# Patient Record
Sex: FEMALE | Race: WHITE | NOT HISPANIC OR LATINO | URBAN - METROPOLITAN AREA
[De-identification: names, ages, dates, MRNs, and addresses within clinical notes are randomized per-mention and may not be internally consistent; named-entity substitution may affect disease eponyms.]

---

## 2018-08-05 ENCOUNTER — INPATIENT (INPATIENT)
Facility: HOSPITAL | Age: 78
LOS: 9 days | Discharge: HOME HEALTH | End: 2018-08-15
Attending: INTERNAL MEDICINE | Admitting: INTERNAL MEDICINE

## 2018-08-05 VITALS
RESPIRATION RATE: 18 BRPM | TEMPERATURE: 97 F | SYSTOLIC BLOOD PRESSURE: 181 MMHG | OXYGEN SATURATION: 100 % | HEART RATE: 73 BPM | DIASTOLIC BLOOD PRESSURE: 79 MMHG

## 2018-08-05 LAB
ALBUMIN SERPL ELPH-MCNC: 4 G/DL — SIGNIFICANT CHANGE UP (ref 3.5–5.2)
ALBUMIN SERPL ELPH-MCNC: 4 G/DL — SIGNIFICANT CHANGE UP (ref 3.5–5.2)
ALP SERPL-CCNC: 128 U/L — HIGH (ref 30–115)
ALP SERPL-CCNC: 157 U/L — HIGH (ref 30–115)
ALT FLD-CCNC: 329 U/L — HIGH (ref 0–41)
ALT FLD-CCNC: 78 U/L — HIGH (ref 0–41)
ANION GAP SERPL CALC-SCNC: 16 MMOL/L — HIGH (ref 7–14)
APPEARANCE UR: ABNORMAL
AST SERPL-CCNC: 171 U/L — HIGH (ref 0–41)
AST SERPL-CCNC: 617 U/L — HIGH (ref 0–41)
BACTERIA # UR AUTO: ABNORMAL /HPF
BASOPHILS # BLD AUTO: 0.06 K/UL — SIGNIFICANT CHANGE UP (ref 0–0.2)
BASOPHILS NFR BLD AUTO: 0.5 % — SIGNIFICANT CHANGE UP (ref 0–1)
BILIRUB DIRECT SERPL-MCNC: 0.2 MG/DL — SIGNIFICANT CHANGE UP (ref 0–0.2)
BILIRUB DIRECT SERPL-MCNC: 0.5 MG/DL — HIGH (ref 0–0.2)
BILIRUB INDIRECT FLD-MCNC: 0.1 MG/DL — LOW (ref 0.2–1.2)
BILIRUB INDIRECT FLD-MCNC: 0.3 MG/DL — SIGNIFICANT CHANGE UP (ref 0.2–1.2)
BILIRUB SERPL-MCNC: 0.3 MG/DL — SIGNIFICANT CHANGE UP (ref 0.2–1.2)
BILIRUB SERPL-MCNC: 0.8 MG/DL — SIGNIFICANT CHANGE UP (ref 0.2–1.2)
BILIRUB UR-MCNC: NEGATIVE — SIGNIFICANT CHANGE UP
BUN SERPL-MCNC: 18 MG/DL — SIGNIFICANT CHANGE UP (ref 10–20)
CALCIUM SERPL-MCNC: 9.5 MG/DL — SIGNIFICANT CHANGE UP (ref 8.5–10.1)
CHLORIDE SERPL-SCNC: 102 MMOL/L — SIGNIFICANT CHANGE UP (ref 98–110)
CO2 SERPL-SCNC: 23 MMOL/L — SIGNIFICANT CHANGE UP (ref 17–32)
COLOR SPEC: YELLOW — SIGNIFICANT CHANGE UP
CREAT SERPL-MCNC: 0.9 MG/DL — SIGNIFICANT CHANGE UP (ref 0.7–1.5)
DIFF PNL FLD: NEGATIVE — SIGNIFICANT CHANGE UP
EOSINOPHIL # BLD AUTO: 0.17 K/UL — SIGNIFICANT CHANGE UP (ref 0–0.7)
EOSINOPHIL NFR BLD AUTO: 1.5 % — SIGNIFICANT CHANGE UP (ref 0–8)
GLUCOSE SERPL-MCNC: 136 MG/DL — HIGH (ref 70–99)
GLUCOSE UR QL: NEGATIVE MG/DL — SIGNIFICANT CHANGE UP
HCT VFR BLD CALC: 41.9 % — SIGNIFICANT CHANGE UP (ref 37–47)
HGB BLD-MCNC: 13.8 G/DL — SIGNIFICANT CHANGE UP (ref 12–16)
IMM GRANULOCYTES NFR BLD AUTO: 0.5 % — HIGH (ref 0.1–0.3)
KETONES UR-MCNC: NEGATIVE — SIGNIFICANT CHANGE UP
LACTATE SERPL-SCNC: 2.7 MMOL/L — HIGH (ref 0.5–2.2)
LEUKOCYTE ESTERASE UR-ACNC: ABNORMAL
LIDOCAIN IGE QN: 57 U/L — SIGNIFICANT CHANGE UP (ref 7–60)
LYMPHOCYTES # BLD AUTO: 1.25 K/UL — SIGNIFICANT CHANGE UP (ref 1.2–3.4)
LYMPHOCYTES # BLD AUTO: 10.9 % — LOW (ref 20.5–51.1)
MCHC RBC-ENTMCNC: 27.2 PG — SIGNIFICANT CHANGE UP (ref 27–31)
MCHC RBC-ENTMCNC: 32.9 G/DL — SIGNIFICANT CHANGE UP (ref 32–37)
MCV RBC AUTO: 82.6 FL — SIGNIFICANT CHANGE UP (ref 81–99)
MONOCYTES # BLD AUTO: 0.58 K/UL — SIGNIFICANT CHANGE UP (ref 0.1–0.6)
MONOCYTES NFR BLD AUTO: 5.1 % — SIGNIFICANT CHANGE UP (ref 1.7–9.3)
NEUTROPHILS # BLD AUTO: 9.36 K/UL — HIGH (ref 1.4–6.5)
NEUTROPHILS NFR BLD AUTO: 81.5 % — HIGH (ref 42.2–75.2)
NITRITE UR-MCNC: NEGATIVE — SIGNIFICANT CHANGE UP
NRBC # BLD: 0 /100 WBCS — SIGNIFICANT CHANGE UP (ref 0–0)
PH UR: 7 — SIGNIFICANT CHANGE UP (ref 5–8)
PLATELET # BLD AUTO: 187 K/UL — SIGNIFICANT CHANGE UP (ref 130–400)
POTASSIUM SERPL-MCNC: 4.2 MMOL/L — SIGNIFICANT CHANGE UP (ref 3.5–5)
POTASSIUM SERPL-SCNC: 4.2 MMOL/L — SIGNIFICANT CHANGE UP (ref 3.5–5)
PROT SERPL-MCNC: 6.7 G/DL — SIGNIFICANT CHANGE UP (ref 6–8)
PROT SERPL-MCNC: 6.8 G/DL — SIGNIFICANT CHANGE UP (ref 6–8)
PROT UR-MCNC: NEGATIVE MG/DL — SIGNIFICANT CHANGE UP
RBC # BLD: 5.07 M/UL — SIGNIFICANT CHANGE UP (ref 4.2–5.4)
RBC # FLD: 13.8 % — SIGNIFICANT CHANGE UP (ref 11.5–14.5)
SODIUM SERPL-SCNC: 141 MMOL/L — SIGNIFICANT CHANGE UP (ref 135–146)
SP GR SPEC: 1.02 — SIGNIFICANT CHANGE UP (ref 1.01–1.03)
TROPONIN T SERPL-MCNC: <0.01 NG/ML — SIGNIFICANT CHANGE UP
UROBILINOGEN FLD QL: 0.2 MG/DL — SIGNIFICANT CHANGE UP (ref 0.2–0.2)
WBC # BLD: 11.48 K/UL — HIGH (ref 4.8–10.8)
WBC # FLD AUTO: 11.48 K/UL — HIGH (ref 4.8–10.8)
WBC UR QL: SIGNIFICANT CHANGE UP /HPF

## 2018-08-05 RX ORDER — FAMOTIDINE 10 MG/ML
20 INJECTION INTRAVENOUS ONCE
Qty: 0 | Refills: 0 | Status: COMPLETED | OUTPATIENT
Start: 2018-08-05 | End: 2018-08-05

## 2018-08-05 RX ORDER — MORPHINE SULFATE 50 MG/1
2 CAPSULE, EXTENDED RELEASE ORAL ONCE
Qty: 0 | Refills: 0 | Status: DISCONTINUED | OUTPATIENT
Start: 2018-08-05 | End: 2018-08-05

## 2018-08-05 RX ORDER — SODIUM CHLORIDE 9 MG/ML
1000 INJECTION INTRAMUSCULAR; INTRAVENOUS; SUBCUTANEOUS ONCE
Qty: 0 | Refills: 0 | Status: COMPLETED | OUTPATIENT
Start: 2018-08-05 | End: 2018-08-05

## 2018-08-05 RX ADMIN — SODIUM CHLORIDE 1000 MILLILITER(S): 9 INJECTION INTRAMUSCULAR; INTRAVENOUS; SUBCUTANEOUS at 21:45

## 2018-08-05 RX ADMIN — FAMOTIDINE 20 MILLIGRAM(S): 10 INJECTION INTRAVENOUS at 17:55

## 2018-08-05 RX ADMIN — MORPHINE SULFATE 2 MILLIGRAM(S): 50 CAPSULE, EXTENDED RELEASE ORAL at 20:41

## 2018-08-05 NOTE — ED PROVIDER NOTE - PROGRESS NOTE DETAILS
on re-eval pain is now to mid abdomen around umbilicus. ct ordered, sono cancelled. will order pain meds pt feels improved after morphine s/o Dr. andersen f/u imaging and re-eval RUQ US had been re-ordered due to elevated LFTs and no clear etiology on CT abd/pelvis. RUQ US showed no pathologies. LFTs elevated plus patient with chest pain and ALEENA score >1 means necessity for admission. SPoke with MAR who will admit patient. Patient informed of pulmonary nodules on CT as well.

## 2018-08-05 NOTE — ED PROVIDER NOTE - OBJECTIVE STATEMENT
77 y/o F with PMH of 1 stent, here for eval of sudden onset epigastric and lower chest pain with no radiation. Pt notes pain was severe but has resolved now. Proceeding to pain pt was eating a chocolate milkshake. Denies SOB, fever/chills, dysuria and lower abdominal pain. 79 y/o F with PMH of HTN, CAD with stent, here for eval of sudden onset epigastric and lower chest pain with no radiation. Pt notes pain was severe but has resolved now. Proceeding to pain pt was eating a chocolate milkshake. Denies SOB, fever/chills, dysuria and lower abdominal pain.

## 2018-08-05 NOTE — ED PROVIDER NOTE - MEDICAL DECISION MAKING DETAILS
Patient presented with chest and epigastric abdominal pain, initial cardiac work up negative but LFTs significantly elevated (no baseline). CT and US did not show clear etiology of symptoms. Patient with ALEENA >1 so required admission for ACS rule out, and subsequently for evaluation for elevated liver enzymes.

## 2018-08-06 LAB
ALBUMIN SERPL ELPH-MCNC: 3.8 G/DL — SIGNIFICANT CHANGE UP (ref 3.5–5.2)
ALBUMIN SERPL ELPH-MCNC: 4 G/DL — SIGNIFICANT CHANGE UP (ref 3.5–5.2)
ALP SERPL-CCNC: 194 U/L — HIGH (ref 30–115)
ALP SERPL-CCNC: 233 U/L — HIGH (ref 30–115)
ALT FLD-CCNC: 1158 U/L — HIGH (ref 0–41)
ALT FLD-CCNC: >700 U/L — HIGH (ref 0–41)
ANION GAP SERPL CALC-SCNC: 14 MMOL/L — SIGNIFICANT CHANGE UP (ref 7–14)
AST SERPL-CCNC: 1397 U/L — HIGH (ref 0–41)
AST SERPL-CCNC: >700 U/L — HIGH (ref 0–41)
BASE EXCESS BLDV CALC-SCNC: 0.3 MMOL/L — SIGNIFICANT CHANGE UP (ref -2–2)
BILIRUB DIRECT SERPL-MCNC: 2.1 MG/DL — HIGH (ref 0–0.2)
BILIRUB DIRECT SERPL-MCNC: 3.4 MG/DL — HIGH (ref 0–0.2)
BILIRUB INDIRECT FLD-MCNC: 0.3 MG/DL — SIGNIFICANT CHANGE UP (ref 0.2–1.2)
BILIRUB INDIRECT FLD-MCNC: 0.6 MG/DL — SIGNIFICANT CHANGE UP (ref 0.2–1.2)
BILIRUB SERPL-MCNC: 2.4 MG/DL — HIGH (ref 0.2–1.2)
BILIRUB SERPL-MCNC: 4 MG/DL — HIGH (ref 0.2–1.2)
BUN SERPL-MCNC: 11 MG/DL — SIGNIFICANT CHANGE UP (ref 10–20)
CA-I SERPL-SCNC: 1.19 MMOL/L — SIGNIFICANT CHANGE UP (ref 1.12–1.3)
CALCIUM SERPL-MCNC: 8.3 MG/DL — LOW (ref 8.5–10.1)
CHLORIDE SERPL-SCNC: 104 MMOL/L — SIGNIFICANT CHANGE UP (ref 98–110)
CK SERPL-CCNC: 50 U/L — SIGNIFICANT CHANGE UP (ref 0–225)
CO2 SERPL-SCNC: 21 MMOL/L — SIGNIFICANT CHANGE UP (ref 17–32)
CREAT SERPL-MCNC: 0.7 MG/DL — SIGNIFICANT CHANGE UP (ref 0.7–1.5)
GAS PNL BLDV: 141 MMOL/L — SIGNIFICANT CHANGE UP (ref 136–145)
GAS PNL BLDV: SIGNIFICANT CHANGE UP
GGT SERPL-CCNC: 334 U/L — HIGH (ref 1–40)
GLUCOSE SERPL-MCNC: 106 MG/DL — HIGH (ref 70–99)
HCO3 BLDV-SCNC: 26 MMOL/L — SIGNIFICANT CHANGE UP (ref 22–29)
HCT VFR BLD CALC: 39 % — SIGNIFICANT CHANGE UP (ref 37–47)
HCT VFR BLDA CALC: 42.3 % — SIGNIFICANT CHANGE UP (ref 34–44)
HGB BLD CALC-MCNC: 13.8 G/DL — LOW (ref 14–18)
HGB BLD-MCNC: 13.2 G/DL — SIGNIFICANT CHANGE UP (ref 12–16)
INR BLD: 1.19 RATIO — SIGNIFICANT CHANGE UP (ref 0.65–1.3)
LACTATE BLDV-MCNC: 1.4 MMOL/L — SIGNIFICANT CHANGE UP (ref 0.5–1.6)
LACTATE SERPL-SCNC: 1.4 MMOL/L — SIGNIFICANT CHANGE UP (ref 0.5–2.2)
MCHC RBC-ENTMCNC: 27.8 PG — SIGNIFICANT CHANGE UP (ref 27–31)
MCHC RBC-ENTMCNC: 33.8 G/DL — SIGNIFICANT CHANGE UP (ref 32–37)
MCV RBC AUTO: 82.3 FL — SIGNIFICANT CHANGE UP (ref 81–99)
NRBC # BLD: 0 /100 WBCS — SIGNIFICANT CHANGE UP (ref 0–0)
PCO2 BLDV: 46 MMHG — SIGNIFICANT CHANGE UP (ref 41–51)
PH BLDV: 7.36 — SIGNIFICANT CHANGE UP (ref 7.26–7.43)
PLATELET # BLD AUTO: 169 K/UL — SIGNIFICANT CHANGE UP (ref 130–400)
PO2 BLDV: 32 MMHG — SIGNIFICANT CHANGE UP (ref 20–40)
POTASSIUM BLDV-SCNC: 4.3 MMOL/L — SIGNIFICANT CHANGE UP (ref 3.3–5.6)
POTASSIUM SERPL-MCNC: 4.1 MMOL/L — SIGNIFICANT CHANGE UP (ref 3.5–5)
POTASSIUM SERPL-SCNC: 4.1 MMOL/L — SIGNIFICANT CHANGE UP (ref 3.5–5)
PROT SERPL-MCNC: 6.3 G/DL — SIGNIFICANT CHANGE UP (ref 6–8)
PROT SERPL-MCNC: 6.6 G/DL — SIGNIFICANT CHANGE UP (ref 6–8)
PROTHROM AB SERPL-ACNC: 12.8 SEC — SIGNIFICANT CHANGE UP (ref 9.95–12.87)
RBC # BLD: 4.74 M/UL — SIGNIFICANT CHANGE UP (ref 4.2–5.4)
RBC # FLD: 13.6 % — SIGNIFICANT CHANGE UP (ref 11.5–14.5)
SAO2 % BLDV: 57 % — SIGNIFICANT CHANGE UP
SODIUM SERPL-SCNC: 139 MMOL/L — SIGNIFICANT CHANGE UP (ref 135–146)
TROPONIN T SERPL-MCNC: <0.01 NG/ML — SIGNIFICANT CHANGE UP
WBC # BLD: 6.03 K/UL — SIGNIFICANT CHANGE UP (ref 4.8–10.8)
WBC # FLD AUTO: 6.03 K/UL — SIGNIFICANT CHANGE UP (ref 4.8–10.8)

## 2018-08-06 RX ORDER — ENOXAPARIN SODIUM 100 MG/ML
40 INJECTION SUBCUTANEOUS EVERY 24 HOURS
Qty: 0 | Refills: 0 | Status: DISCONTINUED | OUTPATIENT
Start: 2018-08-06 | End: 2018-08-15

## 2018-08-06 RX ORDER — MORPHINE SULFATE 50 MG/1
2 CAPSULE, EXTENDED RELEASE ORAL ONCE
Qty: 0 | Refills: 0 | Status: DISCONTINUED | OUTPATIENT
Start: 2018-08-06 | End: 2018-08-06

## 2018-08-06 RX ORDER — TRAMADOL HYDROCHLORIDE 50 MG/1
50 TABLET ORAL ONCE
Qty: 0 | Refills: 0 | Status: DISCONTINUED | OUTPATIENT
Start: 2018-08-06 | End: 2018-08-06

## 2018-08-06 RX ORDER — MORPHINE SULFATE 50 MG/1
2 CAPSULE, EXTENDED RELEASE ORAL EVERY 4 HOURS
Qty: 0 | Refills: 0 | Status: DISCONTINUED | OUTPATIENT
Start: 2018-08-06 | End: 2018-08-06

## 2018-08-06 RX ADMIN — ENOXAPARIN SODIUM 40 MILLIGRAM(S): 100 INJECTION SUBCUTANEOUS at 06:47

## 2018-08-06 RX ADMIN — MORPHINE SULFATE 2 MILLIGRAM(S): 50 CAPSULE, EXTENDED RELEASE ORAL at 04:42

## 2018-08-06 RX ADMIN — MORPHINE SULFATE 2 MILLIGRAM(S): 50 CAPSULE, EXTENDED RELEASE ORAL at 04:17

## 2018-08-06 RX ADMIN — TRAMADOL HYDROCHLORIDE 50 MILLIGRAM(S): 50 TABLET ORAL at 16:05

## 2018-08-06 NOTE — CONSULT NOTE ADULT - ATTENDING COMMENTS
Pt seen and examined with GI team.  Called to see patient for severe periumbilical abdominal pain, transient in nature and abnormal LFTs.  Having reviewed the CT and US results I would obtain an MRCP to ensure that no stone or sludge is present in the CBD.  Given the height of elevation of the transaminases I would also recommend a CLD work up to ensure that no hepatocellular process is present.  Please check hepatitis serologies, BARBARA, ASMA, ceruloplasm, ferritin, % saturation, and AMA

## 2018-08-06 NOTE — H&P ADULT - ASSESSMENT
77 y/o F with PMH of HTN, CAD with stent, depression ,breast ca in remission presents with acute onset abdominal pain around umbilicus area, found to have elevated liver enzymes on admission.    #Abdominal pain / Elevated liver enzymes  Ddx:? passed GB stone vs drug induced vs hepatitis   us abd: GB sludge. CT abd: no evidence of acute pathology  AST/ALT ratio >2:1 ? alcoholic hepatitis (pt. denies alcohol hx)  check blood alcohol level/ ggt  cannot rule out viral or toxin mediated hepatitis (less likely)  check hepatitis panel, toxicology screen  check 2nd set CE and serial ecgs  check urine culture  trend LFTs   pain control  GI evaluation    #HTN/CAD s/p PCI/Depression: Unknown home meds. Pharmacy [facility] and [facility] (closed at this hour). please verify home meds in AM and complete med rec.    #diet: dash diet  dvt ppx  obc 77 y/o F with PMH of HTN, CAD with stent, depression ,breast ca in remission presents with acute onset abdominal pain around umbilicus area, found to have elevated liver enzymes on admission.    #Acute Abdominal pain / Elevated liver enzymes  Ddx:? passed GB stone vs drug induced vs hepatitis   us abd: GB sludge. CT abd: no evidence of acute pathology  AST/ALT ratio >2:1 ? alcoholic hepatitis (pt. denies alcohol hx)  check blood alcohol level/ ggt  cannot rule out viral or toxin mediated hepatitis (less likely)  check hepatitis panel, toxicology screen  check 2nd set CE and serial ecgs  check urine culture  trend LFTs , repeat lactic acid  pain control, keep NPO, IVF fluid NS@75 cc/hr  Surgery / GI evaluation    #HTN/CAD s/p PCI/Depression: Unknown home meds. Pharmacy [facility] and [facility] (closed at this hour). please verify home meds in AM and complete med rec.    #diet:  NPO  dvt ppx  obc

## 2018-08-06 NOTE — CONSULT NOTE ADULT - SUBJECTIVE AND OBJECTIVE BOX
79 yo F w/ h/o HTN, CAD s/p stent(11 yrs ago), depression, breast ca s/p lumpectomy 10 yrs ago-in remission; presented from home on  w/ c/o sudden onset abdominal pain in periumbilical region. Pain started around 3-4 pm, in half an hour after having a drink with ice cream, it was 7/10, sharp, constant, nonradiating, diffuse, no aggravating/relieving factors, got relieved in 3-4 hours after coming here abd getting pain medication. It didn't recur since. She denies any anorexia, weight loss, nausea/vomiting, diarrhea, fever, chills, travel anywhere in last 10 yrs, any recent illness, any recent medication changes, IVDA, alcohol use- hasn't had a drink for a year, any OTC pain medication/tylenol use.     Last colonosocpy and possible endoscopy per patient was 6-7 yrs ago at [place] with Dr Arce--> unremarkable per patient  In ED, VS stable on admission  Found to have elevated liver enzymes so GI was consulted for evaluation.       PAST MEDICAL & SURGICAL HISTORY:  Malignant neoplasm of female breast, unspecified estrogen receptor status, unspecified laterality, unspecified site of breast  Depression, unspecified depression type  Coronary artery disease involving native heart without angina pectoris, unspecified vessel or lesion type  Hypertension, unspecified type    MEDICATIONS  (STANDING):  enoxaparin Injectable 40 milliGRAM(s) SubCutaneous every 24 hours    No Known Allergies    FAMILY HISTORY:  Sister had breast ca. No h/o liver disease or any cancer in family.    REVIEW OF SYSTEMS:    CONSTITUTIONAL: No weakness, fatigue, malaise, fevers or chills, no weight change, appetite change  Throat: No Dysphagia, No Odynophagia  RESPIRATORY: No SOB  CARDIOVASCULAR: No chest pain   Muscloskeletal: no joints pain  NEUROLOGICAL: No syncope or diziness  SKIN: No pruritis  Heme/Lymph: no LN enlargement    Physical Exam:  T(C): 36.9 (18 @ 08:13), Max: 36.9 (18 @ 08:13)  HR: 78 (18 @ 08:13) (73 - 78)  BP: 145/67 (18 @ 08:13) (145/67 - 181/79)  RR: 18 (18 @ 08:13) (18 - 18)  SpO2: 95% (18 @ 08:13) (95% - 100%)  EYES: No scleral icterus   LUNG: Clear to auscultation bilaterally; No rales, rhonchi, wheezing, or rubs  HEART: RRR; No murmurs  ABDOMEN: Soft, +BS, Abdominal Tenderness, No guarding, No Arenas Sign   Rectal Exam: not performed    Blood Work                            13.2   6.03  )-----------( 169      ( 06 Aug 2018 07:12 )             39.0           139  |  104  |  11  ----------------------------<  106<H>  4.1   |  21  |  0.7    Ca    8.3<L>      06 Aug 2018 07:12    TPro  6.3  /  Alb  3.8  /  TBili  2.4<H>  /  DBili  2.1<H>  /  AST  1397<H>  /  ALT  1158<H>  /  AlkPhos  194<H>                Urinalysis Basic - ( 05 Aug 2018 18:00 )    Color: Yellow / Appearance: Cloudy / S.025 / pH: x  Gluc: x / Ketone: Negative  / Bili: Negative / Urobili: 0.2 mg/dL   Blood: x / Protein: Negative mg/dL / Nitrite: Negative   Leuk Esterase: Small / RBC: x / WBC 3-5 /HPF   Sq Epi: x / Non Sq Epi: x / Bacteria: Many /HPF            Lactate Trend   @ 07:12 Lactate:1.4    @ 17:10 Lactate:2.7       CARDIAC MARKERS ( 06 Aug 2018 07:12 )  x     / <0.01 ng/mL / 50 U/L / x     / x      CARDIAC MARKERS ( 05 Aug 2018 17:10 )  x     / <0.01 ng/mL / x     / x     / x            CAPILLARY BLOOD GLUCOSE            < from: US Abdomen Limited (18 @ 01:02) >  Gallbladder sludge.     Otherwise unremarkable rightupper quadrant ultrasound.    < end of copied text >    < from: CT Abdomen and Pelvis w/ IV Cont (18 @ 00:03) >  No CT evidence for acute abdominopelvic pathology.    6 mm left lower lobe pulmonary nodule. As per Fleischner Society   guidelines, follow-up chest CT in 6-12 months is recommended       < end of copied text >          Radiology : 77 yo F w/ h/o HTN, CAD s/p stent(11 yrs ago), depression, breast ca s/p lumpectomy 10 yrs ago-in remission; presented from home on  w/ c/o sudden onset abdominal pain in periumbilical region. Pain started around 3-4 pm, in half an hour after having a drink with ice cream, it was 7/10, sharp, constant, nonradiating, diffuse, no aggravating/relieving factors, got relieved in 3-4 hours after coming here abd getting pain medication. It didn't recur since. She denies any anorexia, weight loss, nausea/vomiting, diarrhea, fever, chills, urine/stool color changes, travel anywhere in last 10 yrs, any recent illness, any recent medication changes, IVDA, alcohol use- hasn't had a drink for a year, any OTC pain medication/tylenol/herbal preparation use.     Last colonosocpy and possible endoscopy per patient was 6-7 yrs ago at [place] with Dr Arce--> unremarkable per patient  In ED, VS stable on admission  Found to have elevated liver enzymes so GI was consulted for evaluation.       PAST MEDICAL & SURGICAL HISTORY:  Malignant neoplasm of female breast, unspecified estrogen receptor status, unspecified laterality, unspecified site of breast  Depression, unspecified depression type  Coronary artery disease involving native heart without angina pectoris, unspecified vessel or lesion type  Hypertension, unspecified type    MEDICATIONS  (STANDING):  enoxaparin Injectable 40 milliGRAM(s) SubCutaneous every 24 hours    No Known Allergies    FAMILY HISTORY:  Sister had breast ca. No h/o liver disease or any cancer in family.    REVIEW OF SYSTEMS:    CONSTITUTIONAL: No weakness, fatigue, malaise, fevers or chills, no weight change, appetite change  Throat: No Dysphagia, No Odynophagia  RESPIRATORY: No SOB  CARDIOVASCULAR: No chest pain   Muscloskeletal: no joints pain  NEUROLOGICAL: No syncope or diziness  SKIN: No pruritis  Heme/Lymph: no LN enlargement    Physical Exam:  T(C): 36.9 (18 @ 08:13), Max: 36.9 (18 @ 08:13)  HR: 78 (18 @ 08:13) (73 - 78)  BP: 145/67 (18 @ 08:13) (145/67 - 181/79)  RR: 18 (18 @ 08:13) (18 - 18)  SpO2: 95% (18 @ 08:13) (95% - 100%)  EYES: No scleral icterus   LUNG: Clear to auscultation bilaterally; No rales, rhonchi, wheezing, or rubs  HEART: RRR; No murmurs  ABDOMEN: Soft, +BS, Abdominal Tenderness, No guarding, No Arenas Sign   Rectal Exam: not performed    Blood Work                            13.2   6.03  )-----------( 169      ( 06 Aug 2018 07:12 )             39.0           139  |  104  |  11  ----------------------------<  106<H>  4.1   |  21  |  0.7    Ca    8.3<L>      06 Aug 2018 07:12    TPro  6.3  /  Alb  3.8  /  TBili  2.4<H>  /  DBili  2.1<H>  /  AST  1397<H>  /  ALT  1158<H>  /  AlkPhos  194<H>                Urinalysis Basic - ( 05 Aug 2018 18:00 )    Color: Yellow / Appearance: Cloudy / S.025 / pH: x  Gluc: x / Ketone: Negative  / Bili: Negative / Urobili: 0.2 mg/dL   Blood: x / Protein: Negative mg/dL / Nitrite: Negative   Leuk Esterase: Small / RBC: x / WBC 3-5 /HPF   Sq Epi: x / Non Sq Epi: x / Bacteria: Many /HPF            Lactate Trend   @ 07:12 Lactate:1.4    @ 17:10 Lactate:2.7       CARDIAC MARKERS ( 06 Aug 2018 07:12 )  x     / <0.01 ng/mL / 50 U/L / x     / x      CARDIAC MARKERS ( 05 Aug 2018 17:10 )  x     / <0.01 ng/mL / x     / x     / x            CAPILLARY BLOOD GLUCOSE            < from: US Abdomen Limited (18 @ 01:02) >  Gallbladder sludge.     Otherwise unremarkable rightupper quadrant ultrasound.    < end of copied text >    < from: CT Abdomen and Pelvis w/ IV Cont (18 @ 00:03) >  No CT evidence for acute abdominopelvic pathology.    6 mm left lower lobe pulmonary nodule. As per Fleischner Society   guidelines, follow-up chest CT in 6-12 months is recommended       < end of copied text >          Radiology :

## 2018-08-06 NOTE — H&P ADULT - ATTENDING COMMENTS
Patient seen and examined at the bed side. not in distress.   Agree with above note.   Abdominal pain with abnormal liver function test enzyme. Worsening enzyme over the hospital stay.   Appreciated GI evaluation. Follow up Lab as per GI  Surgery evaluation before discharge. LFT has been worsening however.

## 2018-08-06 NOTE — CONSULT NOTE ADULT - ASSESSMENT
79 yo F w/ h/o HTN, CAD s/p stent(11 yrs ago), depression, breast ca s/p lumpectomy 10 yrs ago-in remission; presented from home on 08/05 w/ c/o sudden onset abdominal pain in periumbilical region. Pain started around 3-4 pm, in half an hour after having a drink with ice cream, it was 7/10, sharp, constant, nonradiating, diffuse, no aggravating/relieving factors, got relieved in 3-4 hours after coming here abd getting pain medication. It didn't recur since. She denies any anorexia, weight loss, nausea/vomiting, diarrhea, fever, chills, travel anywhere in last 10 yrs, any recent illness, any recent medication changes, IVDA, alcohol use- hasn't had a drink for a year, any OTC pain medication/tylenol use. No family h/o liver disease/cancer.     In ED, VS stable on admission  Found to have elevated liver enzymes so GI was consulted for evaluation.     A & P:    # Acute onset periumbilical abdominal pain w/ elevated liver chemistry in hepatocellular pattern likely 2/2 Acute infectious hepatitis   --> unlikely to be drug induced hepatitis as no hepatotoxic home meds or OTC meds/unlikely ischemic hepatitis as no hypotension/no h/o alcohol or IVDA  - please check 77 yo F w/ h/o HTN, CAD s/p stent(11 yrs ago), depression, breast ca s/p lumpectomy 10 yrs ago-in remission; presented from home on 08/05 w/ c/o sudden onset abdominal pain in periumbilical region. Pain started around 3-4 pm, in half an hour after having a drink with ice cream, it was 7/10, sharp, constant, nonradiating, diffuse, no aggravating/relieving factors, got relieved in 3-4 hours after coming here abd getting pain medication. It didn't recur since. She denies any anorexia, weight loss, nausea/vomiting, diarrhea, fever, chills, travel anywhere in last 10 yrs, any recent illness, any recent medication changes, IVDA, alcohol use- hasn't had a drink for a year, any OTC pain medication/tylenol use. No family h/o liver disease/cancer.     In ED, VS stable on admission  Found to have elevated liver enzymes so GI was consulted for evaluation.     A & P:    # Acute onset periumbilical abdominal pain w/ elevated liver chemistry in hepatocellular pattern likely 2/2 Acute infectious hepatitis   --> unlikely to be drug induced hepatitis as no hepatotoxic home meds or OTC meds/unlikely ischemic hepatitis as no hypotension/no h/o alcohol or IVDA  - please check PT/INR, HSV PCR, HIV  - f/u hep panel  - keep on low fat diet    ---> will discuss case with Dr Willis

## 2018-08-06 NOTE — H&P ADULT - NSHPLABSRESULTS_GEN_ALL_CORE
13.8   11.48 )-----------( 187      ( 05 Aug 2018 17:10 )             41.9           141  |  102  |  18  ----------------------------<  136<H>  4.2   |  23  |  0.9    Ca    9.5      05 Aug 2018 17:10    TPro  6.8  /  Alb  4.0  /  TBili  0.8  /  DBili  0.5<H>  /  AST  617<H>  /  ALT  329<H>  /  AlkPhos  157<H>                Urinalysis Basic - ( 05 Aug 2018 18:00 )    Color: Yellow / Appearance: Cloudy / S.025 / pH: x  Gluc: x / Ketone: Negative  / Bili: Negative / Urobili: 0.2 mg/dL   Blood: x / Protein: Negative mg/dL / Nitrite: Negative   Leuk Esterase: Small / RBC: x / WBC 3-5 /HPF   Sq Epi: x / Non Sq Epi: x / Bacteria: Many /HPF            Lactate Trend   @ 17:10 Lactate:2.7       CARDIAC MARKERS ( 05 Aug 2018 17:10 )  x     / <0.01 ng/mL / x     / x     / x

## 2018-08-06 NOTE — H&P ADULT - HISTORY OF PRESENT ILLNESS
77 y/o F with PMH of HTN, CAD with stent, depression ,breast ca in remission presents with acute onset abdominal pain.  Pt reports severe abdominal pain , 8/10,sharp, non radiating, around umbilicus area since afternoon. Pain started after eating a chocolate milkshake. Denies any associated symptoms. Denies nausea/vomiting/fever/chills/diarrhea/urinary symptoms/flank pain .  Denies CP/sob/cough/palpitations/heaache/travel hx /sick contact. ROS otherwise negative.

## 2018-08-06 NOTE — H&P ADULT - PMH
Coronary artery disease involving native heart without angina pectoris, unspecified vessel or lesion type    Depression, unspecified depression type    Hypertension, unspecified type    Malignant neoplasm of female breast, unspecified estrogen receptor status, unspecified laterality, unspecified site of breast

## 2018-08-07 LAB
ALBUMIN SERPL ELPH-MCNC: 4 G/DL — SIGNIFICANT CHANGE UP (ref 3.5–5.2)
ALP SERPL-CCNC: 252 U/L — HIGH (ref 30–115)
ALT FLD-CCNC: >700 U/L — HIGH (ref 0–41)
ANION GAP SERPL CALC-SCNC: 17 MMOL/L — HIGH (ref 7–14)
AST SERPL-CCNC: 567 U/L — HIGH (ref 0–41)
BASOPHILS # BLD AUTO: 0.05 K/UL — SIGNIFICANT CHANGE UP (ref 0–0.2)
BASOPHILS NFR BLD AUTO: 0.7 % — SIGNIFICANT CHANGE UP (ref 0–1)
BILIRUB DIRECT SERPL-MCNC: 1.2 MG/DL — HIGH (ref 0–0.2)
BILIRUB INDIRECT FLD-MCNC: 0.8 MG/DL — SIGNIFICANT CHANGE UP (ref 0.2–1.2)
BILIRUB SERPL-MCNC: 2 MG/DL — HIGH (ref 0.2–1.2)
BUN SERPL-MCNC: 12 MG/DL — SIGNIFICANT CHANGE UP (ref 10–20)
CALCIUM SERPL-MCNC: 8 MG/DL — LOW (ref 8.5–10.1)
CHLORIDE SERPL-SCNC: 102 MMOL/L — SIGNIFICANT CHANGE UP (ref 98–110)
CO2 SERPL-SCNC: 21 MMOL/L — SIGNIFICANT CHANGE UP (ref 17–32)
CREAT SERPL-MCNC: 0.6 MG/DL — LOW (ref 0.7–1.5)
EOSINOPHIL # BLD AUTO: 0.09 K/UL — SIGNIFICANT CHANGE UP (ref 0–0.7)
EOSINOPHIL NFR BLD AUTO: 1.2 % — SIGNIFICANT CHANGE UP (ref 0–8)
GLUCOSE SERPL-MCNC: 134 MG/DL — HIGH (ref 70–99)
HAV IGM SER-ACNC: SIGNIFICANT CHANGE UP
HBV CORE IGM SER-ACNC: SIGNIFICANT CHANGE UP
HBV SURFACE AB SER-ACNC: SIGNIFICANT CHANGE UP
HBV SURFACE AG SER-ACNC: SIGNIFICANT CHANGE UP
HCT VFR BLD CALC: 41.8 % — SIGNIFICANT CHANGE UP (ref 37–47)
HCV AB S/CO SERPL IA: 0.19 S/CO — SIGNIFICANT CHANGE UP
HCV AB SERPL-IMP: SIGNIFICANT CHANGE UP
HGB BLD-MCNC: 14.1 G/DL — SIGNIFICANT CHANGE UP (ref 12–16)
IMM GRANULOCYTES NFR BLD AUTO: 0.4 % — HIGH (ref 0.1–0.3)
INR BLD: 1.22 RATIO — SIGNIFICANT CHANGE UP (ref 0.65–1.3)
LYMPHOCYTES # BLD AUTO: 0.65 K/UL — LOW (ref 1.2–3.4)
LYMPHOCYTES # BLD AUTO: 8.8 % — LOW (ref 20.5–51.1)
MAGNESIUM SERPL-MCNC: 2.2 MG/DL — SIGNIFICANT CHANGE UP (ref 1.8–2.4)
MCHC RBC-ENTMCNC: 27.4 PG — SIGNIFICANT CHANGE UP (ref 27–31)
MCHC RBC-ENTMCNC: 33.7 G/DL — SIGNIFICANT CHANGE UP (ref 32–37)
MCV RBC AUTO: 81.2 FL — SIGNIFICANT CHANGE UP (ref 81–99)
MONOCYTES # BLD AUTO: 0.36 K/UL — SIGNIFICANT CHANGE UP (ref 0.1–0.6)
MONOCYTES NFR BLD AUTO: 4.9 % — SIGNIFICANT CHANGE UP (ref 1.7–9.3)
NEUTROPHILS # BLD AUTO: 6.17 K/UL — SIGNIFICANT CHANGE UP (ref 1.4–6.5)
NEUTROPHILS NFR BLD AUTO: 84 % — HIGH (ref 42.2–75.2)
NRBC # BLD: 0 /100 WBCS — SIGNIFICANT CHANGE UP (ref 0–0)
PLATELET # BLD AUTO: 157 K/UL — SIGNIFICANT CHANGE UP (ref 130–400)
POTASSIUM SERPL-MCNC: 3.8 MMOL/L — SIGNIFICANT CHANGE UP (ref 3.5–5)
POTASSIUM SERPL-SCNC: 3.8 MMOL/L — SIGNIFICANT CHANGE UP (ref 3.5–5)
PROT SERPL-MCNC: 6.6 G/DL — SIGNIFICANT CHANGE UP (ref 6–8)
PROTHROM AB SERPL-ACNC: 13.2 SEC — HIGH (ref 9.95–12.87)
RBC # BLD: 5.15 M/UL — SIGNIFICANT CHANGE UP (ref 4.2–5.4)
RBC # FLD: 13.8 % — SIGNIFICANT CHANGE UP (ref 11.5–14.5)
SODIUM SERPL-SCNC: 140 MMOL/L — SIGNIFICANT CHANGE UP (ref 135–146)
WBC # BLD: 7.35 K/UL — SIGNIFICANT CHANGE UP (ref 4.8–10.8)
WBC # FLD AUTO: 7.35 K/UL — SIGNIFICANT CHANGE UP (ref 4.8–10.8)

## 2018-08-07 RX ORDER — METOPROLOL TARTRATE 50 MG
25 TABLET ORAL DAILY
Qty: 0 | Refills: 0 | Status: DISCONTINUED | OUTPATIENT
Start: 2018-08-07 | End: 2018-08-08

## 2018-08-07 RX ORDER — METOPROLOL TARTRATE 50 MG
5 TABLET ORAL ONCE
Qty: 0 | Refills: 0 | Status: COMPLETED | OUTPATIENT
Start: 2018-08-07 | End: 2018-08-07

## 2018-08-07 RX ORDER — METOCLOPRAMIDE HCL 10 MG
10 TABLET ORAL ONCE
Qty: 0 | Refills: 0 | Status: COMPLETED | OUTPATIENT
Start: 2018-08-07 | End: 2018-08-07

## 2018-08-07 RX ORDER — LANOLIN ALCOHOL/MO/W.PET/CERES
5 CREAM (GRAM) TOPICAL AT BEDTIME
Qty: 0 | Refills: 0 | Status: DISCONTINUED | OUTPATIENT
Start: 2018-08-07 | End: 2018-08-15

## 2018-08-07 RX ADMIN — ENOXAPARIN SODIUM 40 MILLIGRAM(S): 100 INJECTION SUBCUTANEOUS at 05:19

## 2018-08-07 RX ADMIN — Medication 5 MILLIGRAM(S): at 22:49

## 2018-08-07 RX ADMIN — Medication 25 MILLIGRAM(S): at 05:19

## 2018-08-07 RX ADMIN — Medication 10 MILLIGRAM(S): at 02:44

## 2018-08-07 RX ADMIN — Medication 5 MILLIGRAM(S): at 02:44

## 2018-08-07 NOTE — PROGRESS NOTE ADULT - SUBJECTIVE AND OBJECTIVE BOX
PERRI GROVE  78y Female    INTERVAL HPI/OVERNIGHT EVENTS:  Patient seen and examined.    ROS: All other systems are negative.    Vital Signs:    T(F): 98.1 (08-07-18 @ 05:50), Max: 98.6 (08-06-18 @ 21:00)  HR: 81 (08-07-18 @ 05:50) (75 - 92)  BP: 161/72 (08-07-18 @ 05:50) (148/68 - 183/79)  RR: 18 (08-07-18 @ 05:50) (17 - 18)  SpO2: 95% (08-06-18 @ 22:45) (95% - 96%)  I&O's Summary    Daily Height in cm: 170.18 (06 Aug 2018 22:47)    Daily   CAPILLARY BLOOD GLUCOSE  114 (07 Aug 2018 11:09)  111 (07 Aug 2018 07:44)  124 (07 Aug 2018 02:05)          PHYSICAL EXAM:    GENERAL:  NAD  SKIN: No rashes or lesions  HENT: Atrumatic. Normocephalic. PERRL. Moist membranes.  NECK: Supple, No JVD. No lymphadenopathy.  PULMONARY: CTA B/L. No wheezing. No rales  CVS: Normal S1, S2. Rate and Rythm are regular. No murmurs.  ABDOMEN/GI: Soft, Nontender, Nondistended; BS present  EXTREMITIES: Peripheral pulses intact. No edema B/L LE.  NEUROLOGIC:  No motor or sensory deficit.  PSYCH: Alert & oriented x 3    Consultant(s) Notes Reviewed:  [x ] YES  [ ] NO  Care Discussed with Consultants/Other Providers [ x] YES  [ ] NO    EKG reviewed  Telemetry reviewed    LABS:                        14.1   7.35  )-----------( 157      ( 07 Aug 2018 08:53 )             41.8     08-07    140  |  102  |  12  ----------------------------<  134<H>  3.8   |  21  |  0.6<L>    Ca    8.0<L>      07 Aug 2018 08:53  Mg     2.2     08-07    TPro  6.6  /  Alb  4.0  /  TBili  2.0<H>  /  DBili  1.2<H>  /  AST  567<H>  /  ALT  >700<H>  /  AlkPhos  252<H>  08-07    PT/INR - ( 06 Aug 2018 12:38 )   PT: 12.80 sec;   INR: 1.19 ratio             Trop <0.01, CKMB --, CK 50, 08-06-18 @ 07:12  Trop <0.01, CKMB --, CK --, 08-05-18 @ 17:10      Culture - Urine (collected 05 Aug 2018 19:00)  Source: .Urine Clean Catch (Midstream)  Preliminary Report (07 Aug 2018 09:24):    >100,000 CFU/ml Enterococcus faecalis    <10,000 CFU/ml Normal Urogenital telly present        RADIOLOGY & ADDITIONAL TESTS:      Imaging or report Personally Reviewed:  [ ] YES  [ ] NO    Medications:  Standing  enoxaparin Injectable 40 milliGRAM(s) SubCutaneous every 24 hours  metoprolol succinate ER 25 milliGRAM(s) Oral daily    PRN Meds      Case discussed with resident    Care discussed with pt/family PERRI GROVE  78y Female    INTERVAL HPI/OVERNIGHT EVENTS:  Patient seen and examined. No more abdominal pain. No n/v.    ROS: All other systems are negative.    Vital Signs:    T(F): 98.1 (08-07-18 @ 05:50), Max: 98.6 (08-06-18 @ 21:00)  HR: 81 (08-07-18 @ 05:50) (75 - 92)  BP: 161/72 (08-07-18 @ 05:50) (148/68 - 183/79)  RR: 18 (08-07-18 @ 05:50) (17 - 18)  SpO2: 95% (08-06-18 @ 22:45) (95% - 96%)  I&O's Summary    Daily Height in cm: 170.18 (06 Aug 2018 22:47)    Daily   CAPILLARY BLOOD GLUCOSE  114 (07 Aug 2018 11:09)  111 (07 Aug 2018 07:44)  124 (07 Aug 2018 02:05)          PHYSICAL EXAM:    GENERAL:  NAD  SKIN: No rashes or lesions  HENT: Atrumatic. Normocephalic. PERRL. Moist membranes.  NECK: Supple, No JVD. No lymphadenopathy.  PULMONARY: CTA B/L. No wheezing. No rales  CVS: Normal S1, S2. Rate and Rythm are regular. No murmurs.  ABDOMEN/GI: Soft, Nontender, Nondistended; BS present  EXTREMITIES: Peripheral pulses intact. No edema B/L LE.  NEUROLOGIC:  No motor or sensory deficit.  PSYCH: Alert & oriented x 3    Consultant(s) Notes Reviewed:  [x ] YES  [ ] NO  Care Discussed with Consultants/Other Providers [ x] YES  [ ] NO    EKG reviewed  Telemetry reviewed    LABS:                        14.1   7.35  )-----------( 157      ( 07 Aug 2018 08:53 )             41.8     08-07    140  |  102  |  12  ----------------------------<  134<H>  3.8   |  21  |  0.6<L>    Ca    8.0<L>      07 Aug 2018 08:53  Mg     2.2     08-07    TPro  6.6  /  Alb  4.0  /  TBili  2.0<H>  /  DBili  1.2<H>  /  AST  567<H>  /  ALT  >700<H>  /  AlkPhos  252<H>  08-07    PT/INR - ( 06 Aug 2018 12:38 )   PT: 12.80 sec;   INR: 1.19 ratio             Trop <0.01, CKMB --, CK 50, 08-06-18 @ 07:12  Trop <0.01, CKMB --, CK --, 08-05-18 @ 17:10      Culture - Urine (collected 05 Aug 2018 19:00)  Source: .Urine Clean Catch (Midstream)  Preliminary Report (07 Aug 2018 09:24):    >100,000 CFU/ml Enterococcus faecalis    <10,000 CFU/ml Normal Urogenital telly present        RADIOLOGY & ADDITIONAL TESTS:      Imaging or report Personally Reviewed:  [ ] YES  [ ] NO    Medications:  Standing  enoxaparin Injectable 40 milliGRAM(s) SubCutaneous every 24 hours  metoprolol succinate ER 25 milliGRAM(s) Oral daily    PRN Meds      Case discussed with resident    Care discussed with pt/family

## 2018-08-07 NOTE — PROGRESS NOTE ADULT - SUBJECTIVE AND OBJECTIVE BOX
PERRI GROVE 78y Female  MRN#: [id number]   CODE STATUS:________      SUBJECTIVE  Patient is a 78y old Female who presents with a chief complaint of severe abdominal pain .  Currently admitted to medicine with the primary diagnosis of Transaminitis.  Today is hospital day 1d, and this morning she reports no overnight events.       OBJECTIVE  PAST MEDICAL & SURGICAL HISTORY  Malignant neoplasm of female breast, unspecified estrogen receptor status, unspecified laterality, unspecified site of breast  Depression, unspecified depression type  Coronary artery disease involving native heart without angina pectoris, unspecified vessel or lesion type  Hypertension, unspecified type    ALLERGIES:  No Known Allergies    MEDICATIONS:  STANDING MEDICATIONS  enoxaparin Injectable 40 milliGRAM(s) SubCutaneous every 24 hours  metoprolol succinate ER 25 milliGRAM(s) Oral daily    PRN MEDICATIONS      VITAL SIGNS: Last 24 Hours  T(C): 36.8 (07 Aug 2018 19:54), Max: 37 (06 Aug 2018 21:00)  T(F): 98.2 (07 Aug 2018 19:54), Max: 98.6 (06 Aug 2018 21:00)  HR: 75 (07 Aug 2018 19:54) (75 - 92)  BP: 142/90 (07 Aug 2018 19:54) (139/66 - 183/79)  BP(mean): --  RR: 18 (07 Aug 2018 19:54) (17 - 18)  SpO2: 95% (06 Aug 2018 22:45) (95% - 96%)    LABS:                        14.1   7.35  )-----------( 157      ( 07 Aug 2018 08:53 )             41.8     08-07    140  |  102  |  12  ----------------------------<  134<H>  3.8   |  21  |  0.6<L>    Ca    8.0<L>      07 Aug 2018 08:53  Mg     2.2     08-07    TPro  6.6  /  Alb  4.0  /  TBili  2.0<H>  /  DBili  1.2<H>  /  AST  567<H>  /  ALT  >700<H>  /  AlkPhos  252<H>  08-07    PT/INR - ( 07 Aug 2018 11:57 )   PT: 13.20 sec;   INR: 1.22 ratio                   Culture - Urine (collected 05 Aug 2018 19:00)  Source: .Urine Clean Catch (Midstream)  Preliminary Report (07 Aug 2018 09:24):    >100,000 CFU/ml Enterococcus faecalis    <10,000 CFU/ml Normal Urogenital telly present      CARDIAC MARKERS ( 06 Aug 2018 07:12 )  x     / <0.01 ng/mL / 50 U/L / x     / x          RADIOLOGY:< from: CT Abdomen and Pelvis w/ IV Cont (08.06.18 @ 00:03) >  No CT evidence for acute abdominopelvic pathology.    6 mm left lower lobe pulmonary nodule. As per Fleischner Society   guidelines, follow-up chest CT in 6-12 months is recommended         < end of copied text >    < from: US Abdomen Limited (08.06.18 @ 01:02) >    Gallbladder sludge.     Otherwise unremarkable rightupper quadrant ultrasound.      < end of copied text >        PHYSICAL EXAM:    GENERAL: NAD, well-developed, AAOx3  HEENT:  Atraumatic, Normocephalic. EOMI, PERRLA, conjunctiva and sclera clear, No JVD  PULMONARY: Clear to auscultation bilaterally; No wheeze  CARDIOVASCULAR: Regular rate and rhythm; No murmurs, rubs, or gallops  GASTROINTESTINAL: Soft, Nontender, Nondistended; Bowel sounds present  MUSCULOSKELETAL:  2+ Peripheral Pulses, No clubbing, cyanosis, or edema  NEUROLOGY: non-focal  SKIN: No rashes or lesions      ADMISSION SUMMARY  Patient is a 78y old Female who presents with a chief complaint of severe abdominal pain.  Currently admitted to medicine with the primary diagnosis of Transaminitis.    ASSESSMENT & PLAN    #Transaminitis     us abd: GB sludge. CT abd: no evidence of acute pathology  AST/ALT ratio >2:1 ? alcoholic hepatitis (pt. denies alcohol hx)  check blood alcohol level/ ggt  cannot rule out viral or toxin mediated hepatitis (less likely)  check hepatitis panel, toxicology screen  check 2nd set CE and serial ecgs  check urine culture  trend LFTs , repeat lactic acid  pain control, keep NPO, IVF fluid NS@75 cc/hr  Surgery / GI evaluation    #HTN/CAD s/p PCI/Depression: Unknown home meds. Pharmacy [facility] (closed at this hour). please verify home meds in AM and complete med rec.    #diet:  NPO  dvt ppx  obc PERRI GROVE 78y Female  MRN#: [id number]     SUBJECTIVE  Patient is a 78y old Female who presents with a chief complaint of severe abdominal pain .  Currently admitted to medicine with the primary diagnosis of Transaminitis.  Today is hospital day 1d, and this morning she reports no overnight events.       OBJECTIVE  PAST MEDICAL & SURGICAL HISTORY  Malignant neoplasm of female breast, unspecified estrogen receptor status, unspecified laterality, unspecified site of breast  Depression, unspecified depression type  Coronary artery disease involving native heart without angina pectoris, unspecified vessel or lesion type  Hypertension, unspecified type    ALLERGIES:  No Known Allergies    MEDICATIONS:  STANDING MEDICATIONS  enoxaparin Injectable 40 milliGRAM(s) SubCutaneous every 24 hours  metoprolol succinate ER 25 milliGRAM(s) Oral daily    PRN MEDICATIONS      VITAL SIGNS: Last 24 Hours  T(C): 36.8 (07 Aug 2018 19:54), Max: 37 (06 Aug 2018 21:00)  T(F): 98.2 (07 Aug 2018 19:54), Max: 98.6 (06 Aug 2018 21:00)  HR: 75 (07 Aug 2018 19:54) (75 - 92)  BP: 142/90 (07 Aug 2018 19:54) (139/66 - 183/79)  BP(mean): --  RR: 18 (07 Aug 2018 19:54) (17 - 18)  SpO2: 95% (06 Aug 2018 22:45) (95% - 96%)    LABS:                        14.1   7.35  )-----------( 157      ( 07 Aug 2018 08:53 )             41.8     08-07    140  |  102  |  12  ----------------------------<  134<H>  3.8   |  21  |  0.6<L>    Ca    8.0<L>      07 Aug 2018 08:53  Mg     2.2     08-07    TPro  6.6  /  Alb  4.0  /  TBili  2.0<H>  /  DBili  1.2<H>  /  AST  567<H>  /  ALT  >700<H>  /  AlkPhos  252<H>  08-07    PT/INR - ( 07 Aug 2018 11:57 )   PT: 13.20 sec;   INR: 1.22 ratio                   Culture - Urine (collected 05 Aug 2018 19:00)  Source: .Urine Clean Catch (Midstream)  Preliminary Report (07 Aug 2018 09:24):    >100,000 CFU/ml Enterococcus faecalis    <10,000 CFU/ml Normal Urogenital telly present      CARDIAC MARKERS ( 06 Aug 2018 07:12 )  x     / <0.01 ng/mL / 50 U/L / x     / x          RADIOLOGY:< from: CT Abdomen and Pelvis w/ IV Cont (08.06.18 @ 00:03) >  No CT evidence for acute abdominopelvic pathology.    6 mm left lower lobe pulmonary nodule. As per Fleischner Society   guidelines, follow-up chest CT in 6-12 months is recommended         < end of copied text >    < from: US Abdomen Limited (08.06.18 @ 01:02) >    Gallbladder sludge.     Otherwise unremarkable rightupper quadrant ultrasound.      < end of copied text >        PHYSICAL EXAM:    GENERAL: NAD, well-developed, AAOx3  HEENT:  Atraumatic, Normocephalic. EOMI, PERRLA, conjunctiva and sclera clear, No JVD  PULMONARY: Clear to auscultation bilaterally; No wheeze  CARDIOVASCULAR: Regular rate and rhythm; No murmurs, rubs, or gallops  GASTROINTESTINAL: Soft, Nontender, Nondistended; Bowel sounds present  MUSCULOSKELETAL:  2+ Peripheral Pulses, No clubbing, cyanosis, or edema  NEUROLOGY: non-focal  SKIN: No rashes or lesions      ADMISSION SUMMARY  Patient is a 78y old Female who presents with a chief complaint of severe abdominal pain.  Currently admitted to medicine with the primary diagnosis of Transaminitis.    ASSESSMENT & PLAN    #Transaminitis   -Pain abdomen since yesterday afternoon around the umbilicus, non radiating, 8/10 sharp relieved with pain medication.  -Elevated liver enzymes  -Ultrasound abd: GB sludge  -CT abd: no evidence of acute pathology  -cannot rule out viral or toxin mediated hepatitis   -f/u hepatitis panel, toxicology screen, blood alcohol level, HIV, HSV PCR, BARBARA ASMA AMA Antibodies   -f/u urine culture  -trend LFTs, repeat lactic acid  -keep NPO, IVF fluid NS@75 cc/hr  -f/u GI evaluation    #diet:  NPO  #dvt prophylaxis enoxaparin 40mg sc q24h  obc PERRI GROVE 78y Female  MRN#: [id number]     SUBJECTIVE  Patient is a 78y old Female who presents with a chief complaint of severe abdominal pain .  Currently admitted to medicine with the primary diagnosis of Transaminitis.  Today is hospital day 1d, and this morning she reports no overnight events.       OBJECTIVE  PAST MEDICAL & SURGICAL HISTORY  Malignant neoplasm of female breast, unspecified estrogen receptor status, unspecified laterality, unspecified site of breast  Depression, unspecified depression type  Coronary artery disease involving native heart without angina pectoris, unspecified vessel or lesion type  Hypertension, unspecified type    ALLERGIES:  No Known Allergies    MEDICATIONS:  STANDING MEDICATIONS  enoxaparin Injectable 40 milliGRAM(s) SubCutaneous every 24 hours  metoprolol succinate ER 25 milliGRAM(s) Oral daily    PRN MEDICATIONS      VITAL SIGNS: Last 24 Hours  T(C): 36.8 (07 Aug 2018 19:54), Max: 37 (06 Aug 2018 21:00)  T(F): 98.2 (07 Aug 2018 19:54), Max: 98.6 (06 Aug 2018 21:00)  HR: 75 (07 Aug 2018 19:54) (75 - 92)  BP: 142/90 (07 Aug 2018 19:54) (139/66 - 183/79)  BP(mean): --  RR: 18 (07 Aug 2018 19:54) (17 - 18)  SpO2: 95% (06 Aug 2018 22:45) (95% - 96%)    LABS:                        14.1   7.35  )-----------( 157      ( 07 Aug 2018 08:53 )             41.8     08-07    140  |  102  |  12  ----------------------------<  134<H>  3.8   |  21  |  0.6<L>    Ca    8.0<L>      07 Aug 2018 08:53  Mg     2.2     08-07    TPro  6.6  /  Alb  4.0  /  TBili  2.0<H>  /  DBili  1.2<H>  /  AST  567<H>  /  ALT  >700<H>  /  AlkPhos  252<H>  08-07    PT/INR - ( 07 Aug 2018 11:57 )   PT: 13.20 sec;   INR: 1.22 ratio                   Culture - Urine (collected 05 Aug 2018 19:00)  Source: .Urine Clean Catch (Midstream)  Preliminary Report (07 Aug 2018 09:24):    >100,000 CFU/ml Enterococcus faecalis    <10,000 CFU/ml Normal Urogenital telly present      CARDIAC MARKERS ( 06 Aug 2018 07:12 )  x     / <0.01 ng/mL / 50 U/L / x     / x          RADIOLOGY:< from: CT Abdomen and Pelvis w/ IV Cont (08.06.18 @ 00:03) >  No CT evidence for acute abdominopelvic pathology.    6 mm left lower lobe pulmonary nodule. As per Fleischner Society   guidelines, follow-up chest CT in 6-12 months is recommended         < end of copied text >    < from: US Abdomen Limited (08.06.18 @ 01:02) >    Gallbladder sludge.     Otherwise unremarkable rightupper quadrant ultrasound.      < end of copied text >        PHYSICAL EXAM:    GENERAL: NAD, well-developed, AAOx3  HEENT:  Atraumatic, Normocephalic. EOMI, PERRLA, conjunctiva and sclera clear, No JVD  PULMONARY: Clear to auscultation bilaterally; No wheeze  CARDIOVASCULAR: Regular rate and rhythm; No murmurs, rubs, or gallops  GASTROINTESTINAL: Soft, Nontender, Nondistended; Bowel sounds present  MUSCULOSKELETAL:  2+ Peripheral Pulses, No clubbing, cyanosis, or edema  NEUROLOGY: non-focal  SKIN: No rashes or lesions      ADMISSION SUMMARY  Patient is a 78y old Female who presents with a chief complaint of severe abdominal pain.  Currently admitted to medicine with the primary diagnosis of Transaminitis.    ASSESSMENT & PLAN    #Transaminitis   -Pain abdomen since yesterday afternoon around the umbilicus, non radiating, 8/10 sharp relieved with pain medication.  -Elevated liver enzymes  -Ultrasound abd: GB sludge  -CT abd: no evidence of acute pathology  -cannot rule out viral or toxin mediated hepatitis   -f/u hepatitis panel, toxicology screen, blood alcohol level, HIV, HSV PCR, BARBARA ASMA AMA Antibodies   -f/u urine culture  -trend LFTs, repeat lactic acid  -keep NPO, IVF fluid NS@75 cc/hr  -f/u GI evaluation    #diet:Regular   #dvt prophylaxis enoxaparin 40mg sc q24h  obc

## 2018-08-07 NOTE — PROGRESS NOTE ADULT - ASSESSMENT
79 y/o F with PMH of HTN, CAD with stent, depression ,breast ca in remission presents with acute onset abdominal pain around umbilicus area, found to have elevated liver enzymes on admission.    1.	Abdominal pain  2.	Abnormal Liver function study  3.	CAD/HTN 77 y/o F with PMH of HTN, CAD with stent, depression ,breast ca in remission presents with acute onset abdominal pain around umbilicus area, found to have elevated liver enzymes on admission.    1.	Abdominal pain - resolved  2.	Abnormal Liver function study  3.	CAD/HTN         PLAN:    ·	LFT'S trending down. ALP remains elevated.  ·	GI eval noted. Recommended MRCP and W/U for Ch. liver disease.  ·	Cont Metoprolol

## 2018-08-08 LAB
-  AMPICILLIN: SIGNIFICANT CHANGE UP
-  CIPROFLOXACIN: SIGNIFICANT CHANGE UP
-  LEVOFLOXACIN: SIGNIFICANT CHANGE UP
-  NITROFURANTOIN: SIGNIFICANT CHANGE UP
-  TETRACYCLINE: SIGNIFICANT CHANGE UP
-  VANCOMYCIN: SIGNIFICANT CHANGE UP
ALBUMIN SERPL ELPH-MCNC: 3.7 G/DL — SIGNIFICANT CHANGE UP (ref 3.5–5.2)
ALP SERPL-CCNC: 233 U/L — HIGH (ref 30–115)
ALT FLD-CCNC: 723 U/L — HIGH (ref 0–41)
ANION GAP SERPL CALC-SCNC: 15 MMOL/L — HIGH (ref 7–14)
AST SERPL-CCNC: 372 U/L — HIGH (ref 0–41)
BILIRUB DIRECT SERPL-MCNC: 2.1 MG/DL — HIGH (ref 0–0.2)
BILIRUB INDIRECT FLD-MCNC: 0.4 MG/DL — SIGNIFICANT CHANGE UP (ref 0.2–1.2)
BILIRUB SERPL-MCNC: 2.5 MG/DL — HIGH (ref 0.2–1.2)
BUN SERPL-MCNC: 13 MG/DL — SIGNIFICANT CHANGE UP (ref 10–20)
CALCIUM SERPL-MCNC: 8.3 MG/DL — LOW (ref 8.5–10.1)
CERULOPLASMIN SERPL-MCNC: 29 MG/DL — SIGNIFICANT CHANGE UP (ref 20–60)
CHLORIDE SERPL-SCNC: 104 MMOL/L — SIGNIFICANT CHANGE UP (ref 98–110)
CO2 SERPL-SCNC: 22 MMOL/L — SIGNIFICANT CHANGE UP (ref 17–32)
CREAT SERPL-MCNC: 0.6 MG/DL — LOW (ref 0.7–1.5)
CULTURE RESULTS: SIGNIFICANT CHANGE UP
GLUCOSE SERPL-MCNC: 106 MG/DL — HIGH (ref 70–99)
HCT VFR BLD CALC: 42.4 % — SIGNIFICANT CHANGE UP (ref 37–47)
HGB BLD-MCNC: 14.5 G/DL — SIGNIFICANT CHANGE UP (ref 12–16)
HIV 1+2 AB+HIV1 P24 AG SERPL QL IA: SIGNIFICANT CHANGE UP
MCHC RBC-ENTMCNC: 27.8 PG — SIGNIFICANT CHANGE UP (ref 27–31)
MCHC RBC-ENTMCNC: 34.2 G/DL — SIGNIFICANT CHANGE UP (ref 32–37)
MCV RBC AUTO: 81.2 FL — SIGNIFICANT CHANGE UP (ref 81–99)
METHOD TYPE: SIGNIFICANT CHANGE UP
MITOCHONDRIA AB SER-ACNC: SIGNIFICANT CHANGE UP
NRBC # BLD: 0 /100 WBCS — SIGNIFICANT CHANGE UP (ref 0–0)
ORGANISM # SPEC MICROSCOPIC CNT: SIGNIFICANT CHANGE UP
ORGANISM # SPEC MICROSCOPIC CNT: SIGNIFICANT CHANGE UP
PLATELET # BLD AUTO: 149 K/UL — SIGNIFICANT CHANGE UP (ref 130–400)
POTASSIUM SERPL-MCNC: 3.7 MMOL/L — SIGNIFICANT CHANGE UP (ref 3.5–5)
POTASSIUM SERPL-SCNC: 3.7 MMOL/L — SIGNIFICANT CHANGE UP (ref 3.5–5)
PROT SERPL-MCNC: 6.3 G/DL — SIGNIFICANT CHANGE UP (ref 6–8)
RBC # BLD: 5.22 M/UL — SIGNIFICANT CHANGE UP (ref 4.2–5.4)
RBC # FLD: 13.8 % — SIGNIFICANT CHANGE UP (ref 11.5–14.5)
SMOOTH MUSCLE AB SER-ACNC: SIGNIFICANT CHANGE UP
SODIUM SERPL-SCNC: 141 MMOL/L — SIGNIFICANT CHANGE UP (ref 135–146)
SPECIMEN SOURCE: SIGNIFICANT CHANGE UP
WBC # BLD: 9.07 K/UL — SIGNIFICANT CHANGE UP (ref 4.8–10.8)
WBC # FLD AUTO: 9.07 K/UL — SIGNIFICANT CHANGE UP (ref 4.8–10.8)

## 2018-08-08 RX ORDER — MORPHINE SULFATE 50 MG/1
2 CAPSULE, EXTENDED RELEASE ORAL ONCE
Qty: 0 | Refills: 0 | Status: DISCONTINUED | OUTPATIENT
Start: 2018-08-08 | End: 2018-08-08

## 2018-08-08 RX ORDER — METOPROLOL TARTRATE 50 MG
50 TABLET ORAL DAILY
Qty: 0 | Refills: 0 | Status: DISCONTINUED | OUTPATIENT
Start: 2018-08-08 | End: 2018-08-15

## 2018-08-08 RX ADMIN — ENOXAPARIN SODIUM 40 MILLIGRAM(S): 100 INJECTION SUBCUTANEOUS at 05:23

## 2018-08-08 RX ADMIN — Medication 5 MILLIGRAM(S): at 21:14

## 2018-08-08 RX ADMIN — Medication 25 MILLIGRAM(S): at 05:22

## 2018-08-08 RX ADMIN — Medication 50 MILLIGRAM(S): at 21:14

## 2018-08-08 RX ADMIN — MORPHINE SULFATE 2 MILLIGRAM(S): 50 CAPSULE, EXTENDED RELEASE ORAL at 04:41

## 2018-08-08 RX ADMIN — MORPHINE SULFATE 2 MILLIGRAM(S): 50 CAPSULE, EXTENDED RELEASE ORAL at 06:13

## 2018-08-08 NOTE — PROGRESS NOTE ADULT - ASSESSMENT
77 y/o rhea with PMH of HTN, CAD with stent, depression, Breast ca in remission presents with acute onset abdominal pain around umbilicus area and found to have elevated liver enzymes on admission.    1.	Abdominal pain - resolved again today  2.	Abnormal LFTs  3.	CAD  4.	HTN - uncontrolled         PLAN:    ·	AST and ALT trending down. ALP remains elevated and TB rising. Trend LFTS. INR WNL.   ·	etiology unknown at this time - pt was not hypotensive. Hepatitis profile negative and HIV nonreactive.  ·	MRI shows cholelithiasis but no biliary ductal dilatation or CBD stone  ·	GI f/u appreciated - recommends surgery eval for possible atypical presentation of biliary colic (pt with sludge on US) and liver biopsy by IR  ·	f/u pending tests for liver disease  ·	increase Metoprolol to 25mg q12hr and monitor BP

## 2018-08-08 NOTE — PROGRESS NOTE ADULT - ATTENDING COMMENTS
Pt seen and examined.  Pt does not have biliary dilatation of CBDS on MRCP.  For the possibility of an atypical presentation of biliary colic please recall surgery for their opinion.  Hepatitis serologies are unrevealing and the other serologies are PND.  Since we now believe that she may have a parenchymal cause for her abnormal LFTs I would recommend an IR guided biopsy and discussed with hospitalist.

## 2018-08-08 NOTE — PROGRESS NOTE ADULT - SUBJECTIVE AND OBJECTIVE BOX
PERRI GROVE 78y Female  MRN#: [id number]   CODE STATUS:________      SUBJECTIVE  Patient is a 78y old Female who presents with a chief complaint of severe abdominal pain   Currently admitted to medicine with the primary diagnosis of Transaminitis  Today is hospital day 2d, and this morning she reports diffuse pain abdomen last night which was relieved with morphine.     OBJECTIVE  PAST MEDICAL & SURGICAL HISTORY  Malignant neoplasm of female breast, unspecified estrogen receptor status, unspecified laterality, unspecified site of breast  Depression, unspecified depression type  Coronary artery disease involving native heart without angina pectoris, unspecified vessel or lesion type  Hypertension, unspecified type    ALLERGIES:  No Known Allergies    MEDICATIONS:  STANDING MEDICATIONS  enoxaparin Injectable 40 milliGRAM(s) SubCutaneous every 24 hours  melatonin 5 milliGRAM(s) Oral at bedtime  metoprolol succinate ER 25 milliGRAM(s) Oral daily    PRN MEDICATIONS      VITAL SIGNS: Last 24 Hours  T(C): 36 (08 Aug 2018 12:33), Max: 37.1 (08 Aug 2018 04:05)  T(F): 96.8 (08 Aug 2018 12:33), Max: 98.7 (08 Aug 2018 04:05)  HR: 80 (08 Aug 2018 12:33) (75 - 83)  BP: 119/60 (08 Aug 2018 12:33) (119/60 - 184/81)  BP(mean): --  RR: 18 (08 Aug 2018 12:33) (18 - 18)  SpO2: --    LABS:                        14.5   9.07  )-----------( 149      ( 08 Aug 2018 07:45 )             42.4     08-08    141  |  104  |  13  ----------------------------<  106<H>  3.7   |  22  |  0.6<L>    Ca    8.3<L>      08 Aug 2018 07:45  Mg     2.2     08-07    TPro  6.3  /  Alb  3.7  /  TBili  2.5<H>  /  DBili  2.1<H>  /  AST  372<H>  /  ALT  723<H>  /  AlkPhos  233<H>  08-08    PT/INR - ( 07 Aug 2018 11:57 )   PT: 13.20 sec;   INR: 1.22 ratio                   Culture - Urine (collected 05 Aug 2018 19:00)  Source: .Urine Clean Catch (Midstream)  Final Report (08 Aug 2018 11:30):    >100,000 CFU/ml Enterococcus faecalis    <10,000 CFU/ml Normal Urogenital telly present  Organism: Enterococcus faecalis (08 Aug 2018 11:30)  Organism: Enterococcus faecalis (08 Aug 2018 11:30)      RADIOLOGY: < from: MR Abdomen No Cont (08.07.18 @ 22:38) >  .  Cholelithiasis without evidence for choledocholithiasis or biliary   ductal dilation; low insertion of the cystic duct which courses posterior   to the common hepatic duct.    2.  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 A follow-up pancreas protocol MRI with   contrast is recommended in one year to document stability.    < end of copied text >      from: CT Abdomen and Pelvis w/ IV Cont (08.06.18 @ 00:03) >  No CT evidence for acute abdominopelvic pathology.    6 mm left lower lobe pulmonary nodule. As per Fleischner Society   guidelines, follow-up chest CT in 6-12 months is recommended         < end of copied text >    < from: US Abdomen Limited (08.06.18 @ 01:02) >    Gallbladder sludge.     Otherwise unremarkable rightupper quadrant ultrasound.      < end of copied text >        PHYSICAL EXAM:    GENERAL: NAD, well-developed, AAOx3  HEENT:  Atraumatic, Normocephalic. EOMI, PERRLA, conjunctiva and sclera clear, No JVD  PULMONARY: Clear to auscultation bilaterally; No wheeze  CARDIOVASCULAR: Regular rate and rhythm; No murmurs, rubs, or gallops  GASTROINTESTINAL: Soft, Nontender, distended;                                 No guarding or rigidity, Bowel sounds present  MUSCULOSKELETAL:  2+ Peripheral Pulses, No clubbing, cyanosis, or edema  NEUROLOGY: non-focal  SKIN: No rashes or lesions      ADMISSION SUMMARY  Patient is a 78y old Female who presents with a chief complaint of severe abdominal pain.  Currently admitted to medicine with the primary diagnosis of Transaminitis      ASSESSMENT & PLAN  #Transaminitis   -Pain abdomen  for 1 day around the umbilicus, non radiating, 8/10 sharp relieved with pain medication.  -Patient had diffuse pain abdomen last night which was relieved with morphine.  -Complained of tightness of abdomen this evening. Patient last passed stool yesterday but she said that was her routine.   Abdomen was distended. No guarding or rigidity present. X ray erect abdomen ordered   - liver enzymes trending down, Negative hepatitis panel  -Ultrasound abd: GB sludge  -CT abd: no evidence of acute pathology  -MRCP showed cholelithiasis.  -Surgery consulted for atypical biliary colic (as per GI)  -Liver biopsy scheduled tomorrow by IR.  -f/u GI and surgery consult.  -cannot rule out viral or toxin mediated hepatitis   -f/u  toxicology screen, blood alcohol level, HIV, HSV PCR, BABRARA ASMA AMA Antibodies   -trend LFTs, repeat lactic acid  -keep NPO, IVF fluid NS@75 cc/hr, Hold enoxaparin am dose    #diet:Regular   #dvt prophylaxis enoxaparin 40mg sc q24h  obc

## 2018-08-08 NOTE — PROGRESS NOTE ADULT - ASSESSMENT
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 77 yo F w/ h/o HTN, CAD s/p stent(11 yrs ago), depression, breast ca s/p lumpectomy 10 yrs ago-in remission; presented from home on 08/05 w/ c/o sudden onset abdominal pain in periumbilical region    Acute reccurent abdominal pain with increase liver enzymes rule out passed CBD stone or other parenchymal etiologies    Liver enzymes trending down , Today's labs still pending   MRCP showed cholelithiasis but no CBD stone or dilatation  Acute infectious hepatitis workup negative and the rest of workup still pending   follow liver enzymes  supportive care   Since we now suspect a parenchymal cause for the patient abnormal liver chemistry will recommend IR guided liver biopsy       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

## 2018-08-08 NOTE — PROGRESS NOTE ADULT - SUBJECTIVE AND OBJECTIVE BOX
PERRI GROVEy Female    INTERVAL HPI/OVERNIGHT EVENTS:    Pt had severe lower abdominal pain last night - relieved after IV morphine.   Today, she denies pain, N/V.  Case discussed with GI team at bedside.    T(F): 98.7 (08-08-18 @ 05:52), Max: 98.7 (08-08-18 @ 04:05)  HR: 83 (08-08-18 @ 05:52) (75 - 83)  BP: 175/78 (08-08-18 @ 05:52) (142/90 - 184/81)  RR: 18 (08-08-18 @ 05:52) (18 - 18)  SpO2: --    I&O's Summary    07 Aug 2018 07:01  -  08 Aug 2018 07:00  --------------------------------------------------------  IN: 360 mL / OUT: 0 mL / NET: 360 mL      PHYSICAL EXAM:  GENERAL: NAD  HEAD:  Normocephalic  EYES:  conjunctiva and sclera clear  ENMT: Moist mucous membranes  NECK: Supple, No JVD  NERVOUS SYSTEM:  Alert & Oriented X3, Good concentration  CHEST/LUNG: Clear to percussion bilaterally; No rales, rhonchi, wheezing  HEART: Regular rate and rhythm; No murmurs  ABDOMEN: Soft, Nontender even to deep palpation, Nondistended; Bowel sounds present  EXTREMITIES:   No edema  SKIN: No rashes     Consultant(s) Notes Reviewed:  [x ] YES  [ ] NO  Care Discussed with Consultants/Other Providers [ x] YES  [ ] NO    MEDICATIONS  (STANDING):  enoxaparin Injectable 40 milliGRAM(s) SubCutaneous every 24 hours  melatonin 5 milliGRAM(s) Oral at bedtime  metoprolol succinate ER 25 milliGRAM(s) Oral daily    MEDICATIONS  (PRN):    EKG reviewed  Telemetry reviewed    LABS:                        14.5   9.07  )-----------( 149      ( 08 Aug 2018 07:45 )             42.4     08-08    141  |  104  |  13  ----------------------------<  106<H>  3.7   |  22  |  0.6<L>    Ca    8.3<L>      08 Aug 2018 07:45  Mg     2.2     08-07    TPro  6.3  /  Alb  3.7  /  TBili  2.5<H>  /  DBili  2.1<H>  /  AST  372<H>  /  ALT  723<H>  /  AlkPhos  233<H>  08-08    PT/INR - ( 07 Aug 2018 11:57 )   PT: 13.20 sec;   INR: 1.22 ratio             Urinalysis (08.05.18 @ 18:00)    pH Urine: 7.0    Glucose Qualitative, Urine: Negative mg/dL    Blood, Urine: Negative    Color: Yellow    Urine Appearance: Cloudy    Bilirubin: Negative    Ketone - Urine: Negative    Specific Gravity: 1.025    Protein, Urine: Negative mg/dL    Urobilinogen: 0.2 mg/dL    Nitrite: Negative    Leukocyte Esterase Concentration: Small    Urine Microscopic-Add On (NC) (08.05.18 @ 18:00)    Bacteria: Many /HPF    White Blood Cell - Urine: 3-5 /HPF        Culture - Urine (collected 05 Aug 2018 19:00)  Source: .Urine Clean Catch (Midstream)  Final Report (08 Aug 2018 11:30):    >100,000 CFU/ml Enterococcus faecalis    <10,000 CFU/ml Normal Urogenital telly present  Organism: Enterococcus faecalis (08 Aug 2018 11:30)  Organism: Enterococcus faecalis (08 Aug 2018 11:30)        RADIOLOGY & ADDITIONAL TESTS:    Imaging or report Personally Reviewed:  [x ] YES  [ ] NO    < from: US Abdomen Limited (08.06.18 @ 01:02) >  IMPRESSION:    Gallbladder sludge.     Otherwise unremarkable rightupper quadrant ultrasound.    < end of copied text >      < from: MR Abdomen No Cont (08.07.18 @ 22:38) >  IMPRESSION:  1.  Cholelithiasis without evidence for choledocholithiasis or biliary   ductal dilation; low insertion of the cystic duct which courses posterior   to the common hepatic duct.    2.  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 A follow-up pancreas protocol MRI with   contrast is recommended in one year to document stability.    < end of copied text >    < from: CT Abdomen and Pelvis w/ IV Cont (08.06.18 @ 00:03) >    IMPRESSION:    No CT evidence for acute abdominopelvic pathology.    6 mm left lower lobe pulmonary nodule. As per Fleischner Society   guidelines, follow-up chest CT in 6-12 months is recommended     < end of copied text >      Case discussed with resident    Care discussed with pt

## 2018-08-08 NOTE — PROGRESS NOTE ADULT - SUBJECTIVE AND OBJECTIVE BOX
GI HPI Today:  Patient feels ok, had 1 episode of abdominal pain at night that resolved after having morphine, Denies any vomiting, diarrhea, hematochezia or melena      Hospital course:  79 y/o F with PMH of HTN, CAD with stent, depression ,breast ca in remission presents with acute onset abdominal pain.  Pt reports severe abdominal pain , 8/10,sharp, non radiating, around umbilicus area since afternoon. Pain started after eating a chocolate milkshake. Denies any associated symptoms. Denies nausea/vomiting/fever/chills/diarrhea/urinary symptoms/flank pain .  Denies CP/sob/cough/palpitations/heaache/travel hx /sick contact. ROS otherwise negative. (06 Aug 2018 02:40)      PAST MEDICAL & SURGICAL HISTORY  Malignant neoplasm of female breast, unspecified estrogen receptor status, unspecified laterality, unspecified site of breast  Depression, unspecified depression type  Coronary artery disease involving native heart without angina pectoris, unspecified vessel or lesion type  Hypertension, unspecified type      ALLERGIES:  No Known Allergies      MEDICATIONS:  MEDICATIONS  (STANDING):  enoxaparin Injectable 40 milliGRAM(s) SubCutaneous every 24 hours  melatonin 5 milliGRAM(s) Oral at bedtime  metoprolol succinate ER 25 milliGRAM(s) Oral daily    MEDICATIONS  (PRN):      REVIEW OF SYSTEMS:    CONSTITUTIONAL: weakness, fatigue, no fevers or chills,   Throat: No Dysphagia, No Odynophagia  RESPIRATORY: No SOB  CARDIOVASCULAR: No chest pain   Muscloskeletal: no joints pain  NEUROLOGICAL: No syncope or diziness  SKIN: No pruritis  Heme/Lymph: no LN enlargement         VITALS:   T(F): 98.7 (08-08 @ 05:52), Max: 98.7 (08-08 @ 04:05)  HR: 83 (08-08 @ 05:52) (73 - 92)  BP: 175/78 (08-08 @ 05:52) (139/66 - 184/81)  BP(mean): --  RR: 18 (08-08 @ 05:52) (17 - 18)  SpO2: 95% (08-06 @ 22:45) (95% - 100%)        PHYSICAL EXAM:  EYES: No scleral icterus   LUNG: Clear to auscultation bilaterally; No rales, rhonchi, wheezing, or rubs  HEART: RRR; No murmurs  ABDOMEN: Soft, +BS, no Abdominal Tenderness, No guarding, No [place] Sign   Rectal Exam: no performed     Blood Work :                        14.1   7.35  )-----------( 157      ( 07 Aug 2018 08:53 )             41.8     PT/INR - ( 07 Aug 2018 11:57 )  INR: 1.22            08-07    140  |  102  |  12  ----------------------------<  134<H>  3.8   |  21  |  0.6<L>    Ca    8.0<L>      07 Aug 2018 08:53  Mg     2.2     08-07      CBC -  ( 07 Aug 2018 08:53 )  Hemoglobin : 14.1    CBC -  ( 06 Aug 2018 07:12 )  Hemoglobin : 13.2    CBC -  ( 05 Aug 2018 17:10 )  Hemoglobin : 13.8      LIVER FUNCTIONS - ( 07 Aug 2018 08:53 )  Alb: 4.0 [3.5 - 5.2] / Pro: 6.6 [6.0 - 8.0] / ALK PHOS: 252 [30 - 115] / ALT: >700 [0 - 41] / AST: 567 [0 - 41] / GGT: x     LIVER FUNCTIONS - ( 06 Aug 2018 17:12 )  Alb: 4.0 [3.5 - 5.2] / Pro: 6.6 [6.0 - 8.0] / ALK PHOS: 233 [30 - 115] / ALT: 1142.0 [0 - 41] / AST: 928.5 [0 - 41] / GGT: x     LIVER FUNCTIONS - ( 06 Aug 2018 07:12 )  Alb: 3.8 [3.5 - 5.2] / Pro: 6.3 [6.0 - 8.0] / ALK PHOS: 194 [30 - 115] / ALT: 1158 [0 - 41] / AST: 1397 [0 - 41] / GGT: 334 [1 - 40]     LIVER FUNCTIONS - ( 05 Aug 2018 20:21 )  Alb: 4.0 [3.5 - 5.2] / Pro: 6.8 [6.0 - 8.0] / ALK PHOS: 157 [30 - 115] / ALT: 329 [0 - 41] / AST: 617 [0 - 41] / GGT: x     LIVER FUNCTIONS - ( 05 Aug 2018 17:10 )  Alb: 4.0 [3.5 - 5.2] / Pro: 6.7 [6.0 - 8.0] / ALK PHOS: 128 [30 - 115] / ALT: 78 [0 - 41] / AST: 171 [0 - 41] / GGT: x         RADIOLOGY:    < from: MR Abdomen No Cont (08.07.18 @ 22:38) >  NTERPRETATION:  CLINICAL STATEMENT:  Elevated liver enzymes.    TECHNIQUE: Axial in- and out-of-phase T1-weighted; axial fat-saturated   T2-weighted; axial and coronal single-shotfast spin-echo T2-weighted; 3D   high-resolution, heavily T2-weighted; and diffusion-weighted images were   acquired.    COMPARISON:  No prior MRIs available for comparison. Correlation made   with CT abdomen and pelvis and right upper quadrant ultrasound dated   August 6, 2018.    FINDINGS:    HEPATOBILIARY: No focal hepatic lesions. Cholelithiasis. Low insertion of   the cystic duct which courses posterior to the common hepatic duct. No   evidence for choledocholithiasis or biliary ductal dilation.     SPLEEN:  Unremarkable.    PANCREAS: A 0.7 cm T2 hyperintense lesion in the pancreatic head likely   communicates cava with the non-dilated main pancreatic duct (series 6,   image 21). Additional smaller, 0.3 cm T2 hyperintense lesion in the   pancreatic body communicates with the main pancreatic duct (series 6,   image 12).    ADRENAL GLANDS:  Unremarkable.    KIDNEYS: Bilateral parapelvic and left cortical renal cyst. No   hydronephrosis.    ABDOMINAL NODES:  No lymphadenopathy.    BONES/SOFT TISSUES: Severe chronic compression deformity of T12 vertebral   body. Susceptibility artifact from bilateral hip hip prostheses.    OTHER:  Tiny fat containing umbilical hernia.    IMPRESSION:  1.  Cholelithiasis without evidence for choledocholithiasis or biliary   ductal dilation; low insertion of the cystic duct which courses posterior   to the common hepatic duct.    2.  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 A follow-up pancreas protocol MRI with   contrast is recommended in one year to document stability.                  DONNELL ORR M.D., ATTENDING RADIOLOGIST    < end of copied text >

## 2018-08-08 NOTE — CONSULT NOTE ADULT - SUBJECTIVE AND OBJECTIVE BOX
INTERVENTIONAL RADIOLOGY CONSULT:     Procedure Requested: non target liver biopsy    HPI:  77 y/o F with PMH of HTN, CAD with stent, depression ,breast ca in remission presents with acute onset abdominal pain.  Pt reports severe abdominal pain , 8/10,sharp, non radiating, around umbilicus area since afternoon. Pain started after eating a chocolate milkshake. Denies any associated symptoms. Denies nausea/vomiting/fever/chills/diarrhea/urinary symptoms/flank pain .  Denies CP/sob/cough/palpitations/heaache/travel hx /sick contact. ROS otherwise negative. (06 Aug 2018 02:40)    patient presents with transaminitis of unknown etiology     PAST MEDICAL & SURGICAL HISTORY:  Malignant neoplasm of female breast, unspecified estrogen receptor status, unspecified laterality, unspecified site of breast  Depression, unspecified depression type  Coronary artery disease involving native heart without angina pectoris, unspecified vessel or lesion type  Hypertension, unspecified type      MEDICATIONS  (STANDING):  enoxaparin Injectable 40 milliGRAM(s) SubCutaneous every 24 hours  melatonin 5 milliGRAM(s) Oral at bedtime  metoprolol succinate ER 25 milliGRAM(s) Oral daily    MEDICATIONS  (PRN):      Allergies    No Known Allergies    Intolerances        Social History:   Smoking: Yes [ ]  No [ ]   ______pk yrs  ETOH  Yes [ ]  No [ ]  Social [ ]  DRUGS:  Yes [ ]  No [ ]  if so what______________    FAMILY HISTORY:  No pertinent family history in first degree relatives      Physical Exam:   Vital Signs Last 24 Hrs  T(C): 36 (08 Aug 2018 12:33), Max: 37.1 (08 Aug 2018 04:05)  T(F): 96.8 (08 Aug 2018 12:33), Max: 98.7 (08 Aug 2018 04:05)  HR: 80 (08 Aug 2018 12:33) (75 - 83)  BP: 119/60 (08 Aug 2018 12:33) (119/60 - 184/81)  BP(mean): --  RR: 18 (08 Aug 2018 12:33) (18 - 18)  SpO2: --    General:     Lungs:    Cardiovascular:     Abdomen:    Extremities:    Musculoskeletal:    Neuro/Psych:        Labs:                         14.5   9.07  )-----------( 149      ( 08 Aug 2018 07:45 )             42.4     08-08    141  |  104  |  13  ----------------------------<  106<H>  3.7   |  22  |  0.6<L>    Ca    8.3<L>      08 Aug 2018 07:45  Mg     2.2     08-07    TPro  6.3  /  Alb  3.7  /  TBili  2.5<H>  /  DBili  2.1<H>  /  AST  372<H>  /  ALT  723<H>  /  AlkPhos  233<H>  08-08    PT/INR - ( 07 Aug 2018 11:57 )   PT: 13.20 sec;   INR: 1.22 ratio             Pertinent labs:                      14.5   9.07  )-----------( 149      ( 08 Aug 2018 07:45 )             42.4       08-08    141  |  104  |  13  ----------------------------<  106<H>  3.7   |  22  |  0.6<L>    Ca    8.3<L>      08 Aug 2018 07:45  Mg     2.2     08-07    TPro  6.3  /  Alb  3.7  /  TBili  2.5<H>  /  DBili  2.1<H>  /  AST  372<H>  /  ALT  723<H>  /  AlkPhos  233<H>  08-08      PT/INR - ( 07 Aug 2018 11:57 )   PT: 13.20 sec;   INR: 1.22 ratio             Radiology & Additional Studies:     Radiology imaging reviewed.       ASSESSMENT/ PLAN:     plan for non target liver biopsy in IR tomorrow. will receive IV sedation.     NPO pmn. hold AM DVT PPX.     discussed with team.     Thank you for the courtesy of this consult, please call [phone] with any further questions.

## 2018-08-09 LAB
ALBUMIN SERPL ELPH-MCNC: 3.9 G/DL — SIGNIFICANT CHANGE UP (ref 3.5–5.2)
ALP SERPL-CCNC: 271 U/L — HIGH (ref 30–115)
ALT FLD-CCNC: 603 U/L — HIGH (ref 0–41)
ANION GAP SERPL CALC-SCNC: 15 MMOL/L — HIGH (ref 7–14)
APPEARANCE UR: ABNORMAL
APTT BLD: 39.9 SEC — HIGH (ref 27–39.2)
AST SERPL-CCNC: 274 U/L — HIGH (ref 0–41)
BACTERIA # UR AUTO: ABNORMAL /HPF
BILIRUB DIRECT SERPL-MCNC: 2.1 MG/DL — HIGH (ref 0–0.2)
BILIRUB INDIRECT FLD-MCNC: 0.7 MG/DL — SIGNIFICANT CHANGE UP (ref 0.2–1.2)
BILIRUB SERPL-MCNC: 2.8 MG/DL — HIGH (ref 0.2–1.2)
BILIRUB UR-MCNC: ABNORMAL
BUN SERPL-MCNC: 14 MG/DL — SIGNIFICANT CHANGE UP (ref 10–20)
CALCIUM SERPL-MCNC: 8 MG/DL — LOW (ref 8.5–10.1)
CHLORIDE SERPL-SCNC: 107 MMOL/L — SIGNIFICANT CHANGE UP (ref 98–110)
CO2 SERPL-SCNC: 20 MMOL/L — SIGNIFICANT CHANGE UP (ref 17–32)
COLOR SPEC: ABNORMAL
CREAT SERPL-MCNC: 0.6 MG/DL — LOW (ref 0.7–1.5)
DIFF PNL FLD: NEGATIVE — SIGNIFICANT CHANGE UP
EPI CELLS # UR: ABNORMAL /HPF
FERRITIN SERPL-MCNC: 148 NG/ML — SIGNIFICANT CHANGE UP (ref 15–150)
GLUCOSE SERPL-MCNC: 106 MG/DL — HIGH (ref 70–99)
GLUCOSE UR QL: NEGATIVE MG/DL — SIGNIFICANT CHANGE UP
HCT VFR BLD CALC: 41.8 % — SIGNIFICANT CHANGE UP (ref 37–47)
HGB BLD-MCNC: 14.3 G/DL — SIGNIFICANT CHANGE UP (ref 12–16)
KETONES UR-MCNC: 15
LEUKOCYTE ESTERASE UR-ACNC: ABNORMAL
MCHC RBC-ENTMCNC: 27.8 PG — SIGNIFICANT CHANGE UP (ref 27–31)
MCHC RBC-ENTMCNC: 34.2 G/DL — SIGNIFICANT CHANGE UP (ref 32–37)
MCV RBC AUTO: 81.2 FL — SIGNIFICANT CHANGE UP (ref 81–99)
NITRITE UR-MCNC: POSITIVE
NRBC # BLD: 0 /100 WBCS — SIGNIFICANT CHANGE UP (ref 0–0)
PH UR: 6 — SIGNIFICANT CHANGE UP (ref 5–8)
PLATELET # BLD AUTO: 146 K/UL — SIGNIFICANT CHANGE UP (ref 130–400)
POTASSIUM SERPL-MCNC: 4 MMOL/L — SIGNIFICANT CHANGE UP (ref 3.5–5)
POTASSIUM SERPL-SCNC: 4 MMOL/L — SIGNIFICANT CHANGE UP (ref 3.5–5)
PROT SERPL-MCNC: 6.4 G/DL — SIGNIFICANT CHANGE UP (ref 6–8)
PROT UR-MCNC: 30 MG/DL
RBC # BLD: 5.15 M/UL — SIGNIFICANT CHANGE UP (ref 4.2–5.4)
RBC # FLD: 14 % — SIGNIFICANT CHANGE UP (ref 11.5–14.5)
SODIUM SERPL-SCNC: 142 MMOL/L — SIGNIFICANT CHANGE UP (ref 135–146)
SP GR SPEC: 1.02 — SIGNIFICANT CHANGE UP (ref 1.01–1.03)
UROBILINOGEN FLD QL: 2 MG/DL (ref 0.2–0.2)
WBC # BLD: 6.97 K/UL — SIGNIFICANT CHANGE UP (ref 4.8–10.8)
WBC # FLD AUTO: 6.97 K/UL — SIGNIFICANT CHANGE UP (ref 4.8–10.8)
WBC UR QL: ABNORMAL /HPF

## 2018-08-09 RX ORDER — TRAMADOL HYDROCHLORIDE 50 MG/1
25 TABLET ORAL ONCE
Qty: 0 | Refills: 0 | Status: DISCONTINUED | OUTPATIENT
Start: 2018-08-09 | End: 2018-08-09

## 2018-08-09 RX ORDER — MORPHINE SULFATE 50 MG/1
2 CAPSULE, EXTENDED RELEASE ORAL ONCE
Qty: 0 | Refills: 0 | Status: DISCONTINUED | OUTPATIENT
Start: 2018-08-09 | End: 2018-08-09

## 2018-08-09 RX ORDER — ESCITALOPRAM OXALATE 10 MG/1
5 TABLET, FILM COATED ORAL DAILY
Qty: 0 | Refills: 0 | Status: DISCONTINUED | OUTPATIENT
Start: 2018-08-09 | End: 2018-08-14

## 2018-08-09 RX ORDER — TRAZODONE HCL 50 MG
25 TABLET ORAL
Qty: 0 | Refills: 0 | Status: DISCONTINUED | OUTPATIENT
Start: 2018-08-09 | End: 2018-08-11

## 2018-08-09 RX ADMIN — MORPHINE SULFATE 2 MILLIGRAM(S): 50 CAPSULE, EXTENDED RELEASE ORAL at 17:32

## 2018-08-09 RX ADMIN — Medication 5 MILLIGRAM(S): at 21:17

## 2018-08-09 RX ADMIN — Medication 50 MILLIGRAM(S): at 05:46

## 2018-08-09 RX ADMIN — MORPHINE SULFATE 2 MILLIGRAM(S): 50 CAPSULE, EXTENDED RELEASE ORAL at 18:26

## 2018-08-09 RX ADMIN — MORPHINE SULFATE 2 MILLIGRAM(S): 50 CAPSULE, EXTENDED RELEASE ORAL at 21:17

## 2018-08-09 RX ADMIN — Medication 25 MILLIGRAM(S): at 21:17

## 2018-08-09 RX ADMIN — TRAMADOL HYDROCHLORIDE 25 MILLIGRAM(S): 50 TABLET ORAL at 16:06

## 2018-08-09 RX ADMIN — MORPHINE SULFATE 2 MILLIGRAM(S): 50 CAPSULE, EXTENDED RELEASE ORAL at 22:31

## 2018-08-09 RX ADMIN — TRAMADOL HYDROCHLORIDE 25 MILLIGRAM(S): 50 TABLET ORAL at 15:46

## 2018-08-09 RX ADMIN — ESCITALOPRAM OXALATE 5 MILLIGRAM(S): 10 TABLET, FILM COATED ORAL at 21:17

## 2018-08-09 NOTE — CONSULT NOTE ADULT - SUBJECTIVE AND OBJECTIVE BOX
Consultation Note  =====================================================    HPI: 78y Female with PMH of HTN, CAD with stent, depression ,breast ca in remission presents with acute onset abdominal pain. Presented with severe abdominal pain, 8/10,sharp, non radiating, around umbilicus area x1 day, started after drinking a chocolate milkshake. Admitted to medicine with elevated transaminases initially 1000s, now trending down to 200s. Elevated bilirubin initially 4 now 2. Had US RUQ demonstrating sludge in GB, no acute cholecystitis. MRCP was done, demonstrating cholelithiasis, no CBD dilatation, no obstructing masses. GI is onboard, recommended Liver biopsy. Patient had US guided CNB of liver today with IR. Patient currently complains of post-procedure RUQ abdominal pain and R shoulder pain. No nasuea, no vomiting, tolerating diet, having regular BM and passing flatus. No fevers or chills, no leukocytosis.     PAST MEDICAL & SURGICAL HISTORY:  Malignant neoplasm of female breast, unspecified estrogen receptor status, unspecified laterality, unspecified site of breast  Depression, unspecified depression type  Coronary artery disease involving native heart without angina pectoris, unspecified vessel or lesion type  Hypertension, unspecified type    Home Meds: Home Medications:  Lexapro:  (05 Aug 2018 17:58)  metoprolol:  (05 Aug 2018 17:58)    Allergies: Allergies  No Known Allergies    Soc:   Denies Tobacco/EtOH/Substance use  Advanced Directives: Presumed Full Code     ROS:    REVIEW OF SYSTEMS  [ x ] A ten-point review of systems was otherwise negative except as noted.    CURRENT MEDICATIONS:   --------------------------------------------------------------------------------------  Neurologic Medications  melatonin 5 milliGRAM(s) Oral at bedtime    Cardiovascular Medications  metoprolol succinate ER 50 milliGRAM(s) Oral daily    Hematologic/Oncologic Medications  enoxaparin Injectable 40 milliGRAM(s) SubCutaneous every 24 hours  --------------------------------------------------------------------------------------  VITAL SIGNS, INS/OUTS (last 24 hours):  --------------------------------------------------------------------------------------  Vital Signs Last 24 Hrs  T(C): 37.1 (09 Aug 2018 14:34), Max: 37.7 (08 Aug 2018 20:00)  T(F): 98.7 (09 Aug 2018 14:34), Max: 99.9 (08 Aug 2018 20:00)  HR: 81 (09 Aug 2018 14:34) (77 - 86)  BP: 131/60 (09 Aug 2018 14:34) (131/60 - 138/65)  RR: 18 (09 Aug 2018 14:34) (18 - 18)  SpO2: 95% (09 Aug 2018 08:01) (95% - 95%)  --------------------------------------------------------------------------------------  PHYSICAL EXAM  General: NAD, AAOx3, calm and cooperative  HEENT: NCAT, no scleral icterus  Cardiac: RRR S1, S2, no Murmurs, rubs or gallops  Respiratory: CTAB, normal respiratory effort, breath sounds equal BL, no wheeze, rhonchi or crackles  Abdomen: Soft, non-distended, +tenderness RUQ, +bowel sounds, no rebound no guarding  Skin: Warm/dry, normal color, no jaundice    LABS  --------------------------------------------------------------------------------------  Labs:  CAPILLARY BLOOD GLUCOSE                        14.3   6.97  )-----------( 146      ( 09 Aug 2018 08:33 )             41.8       08-09  142  |  107  |  14  ----------------------------<  106<H>  4.0   |  20  |  0.6<L>    Calcium, Total Serum: 8.0 mg/dL (08-09-18 @ 08:33)    LFTs:     6.4  | 2.8  | 274      ------------------[271     ( 09 Aug 2018 08:33 )  3.9  | 2.1  | 603         Lipase:x      Amylase:x        Coags:  x      ----< x       ( 09 Aug 2018 08:33 )     39.9     --------------------------------------------------------------------------------------  IMAGING RESULTS  < from: US Abdomen Limited (08.06.18 @ 01:02) >  IMPRESSION:  Gallbladder sludge.   Otherwise unremarkable rightupper quadrant ultrasound.  < end of copied text >    < from: MR Abdomen No Cont (08.07.18 @ 22:38) >  IMPRESSION:  1.  Cholelithiasis without evidence for choledocholithiasis or biliary   ductal dilation; low insertion of the cystic duct which courses posterior   to the common hepatic duct.  2.  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 A follow-up pancreas protocol MRI with   contrast is recommended in one year to document stability.  < end of copied text >    < from: Xray Abdomen 2 Views (08.08.18 @ 21:17) >  Impression  New dilated loop of small bowel in the left abdomen, which may represent   ileus versus small bowel obstruction. Follow-up is recommended.  < end of copied text >  ---------------------------------------------------------------------------------------

## 2018-08-09 NOTE — PROGRESS NOTE ADULT - SUBJECTIVE AND OBJECTIVE BOX
Interventional Radiology Brief- Operative Note    Procedure:  US guided liver biopsy    Pre-Op Diagnosis:  elevated LFTs	    Post-Op Diagnosis:  same 	    Attending:  Yoly	    Resident:  Kerry    Anesthesia (type):  [ ] General Anesthesia  [x ] Sedation  [ ] Spinal Anesthesia  [ ] Local/Regional    Contrast: none    Estimated Blood Loss:  <5ml  Condition:   [ ] Critical  [ ] Serious  [ ] Fair   [x ] Good    Findings/Follow up Plan of Care:  -F/U results in 5 days    Specimens Removed:  2 cores  Implants:  none  Complications:  none  Condition/Disposition:  Dc to floor    Please call Interventional Radiology [phone] with any questions, concerns, or issues.

## 2018-08-09 NOTE — CONSULT NOTE ADULT - ASSESSMENT
ASSESSMENT:  78y Female with elevated transaminases that have been trending down, initially presenting with generalized abdominal pain that has since resolved, S/P US-guided CNB of liver by IR today, surgery consulted for ?atypical biliary colic. Not complicated cholecystitis, choledocholithiasis, or cholangitis, and no e/o current biliary colic, GI currently ruling out parenchymal liver diseases.     PLAN:   F/U CNB results  C/W current workup to r/o parenchymal diseases  F/U serologies  Hepatitis panels negative  C/W management as per medical team  No surgical intervention indicated at this time  If the patient develops biliary colic and would like to have elective cholecystectomy, she may follow up in the surgery clinic    Above plan discussed with  *** , patient and patient's family, and primary medical team    --------------------------------------------------------------------------------------    08-09-18 @ 16:00

## 2018-08-09 NOTE — PROGRESS NOTE ADULT - ASSESSMENT
79 y/o F with PMH of HTN, CAD with stent, depression ,breast ca in remission presents with acute onset abdominal pain around umbilicus area, found to have elevated liver enzymes on admission.    1.	Abdominal pain - resolved  2.	Abnormal Liver function study  3.	CAD/HTN         PLAN:    ·	S/P Liver biopsy. Follow H/H and LFT'S  ·	AST, ALT trending down but ALP remains elevated.  ·	MRCP unremarkable  ·	Mitochondrial and smooth muscle antibodies are negative.  ·	Hep profile is also negative.  ·	Follow liver biopsy  ·	Cont Metoprolol

## 2018-08-09 NOTE — PROGRESS NOTE ADULT - SUBJECTIVE AND OBJECTIVE BOX
PERRI GROVE  78y Female    INTERVAL HPI/OVERNIGHT EVENTS:  Patient seen and examined. S/P liver biopsy. C/O Rt. shoulder pain. No abdominal pain. No N/V    ROS: All other systems are negative.    Vital Signs:    T(F): 98.7 (08-09-18 @ 14:34), Max: 99.9 (08-08-18 @ 20:00)  HR: 81 (08-09-18 @ 14:34) (77 - 86)  BP: 131/60 (08-09-18 @ 14:34) (131/60 - 138/65)  RR: 18 (08-09-18 @ 14:34) (18 - 18)  SpO2: 95% (08-09-18 @ 08:01) (95% - 95%)  I&O's Summary    Daily Height in cm: 170.18 (09 Aug 2018 05:31)    Daily   CAPILLARY BLOOD GLUCOSE          PHYSICAL EXAM:    GENERAL:  NAD  SKIN: No rashes or lesions  HENT: Atrumatic. Normocephalic. PERRL. Moist membranes.  NECK: Supple, No JVD. No lymphadenopathy.  PULMONARY: CTA B/L. No wheezing. No rales  CVS: Normal S1, S2. Rate and Rythm are regular. No murmurs.  ABDOMEN/GI: Soft, Nontender, Nondistended; BS present  EXTREMITIES: Peripheral pulses intact. No edema B/L LE.  NEUROLOGIC:  No motor or sensory deficit.  PSYCH: Alert & oriented x 3    Consultant(s) Notes Reviewed:  [x ] YES  [ ] NO  Care Discussed with Consultants/Other Providers [ x] YES  [ ] NO    LABS:                        14.3   6.97  )-----------( 146      ( 09 Aug 2018 08:33 )             41.8     08-09    142  |  107  |  14  ----------------------------<  106<H>  4.0   |  20  |  0.6<L>    Ca    8.0<L>      09 Aug 2018 08:33    TPro  6.4  /  Alb  3.9  /  TBili  2.8<H>  /  DBili  2.1<H>  /  AST  274<H>  /  ALT  603<H>  /  AlkPhos  271<H>  08-09    PTT - ( 09 Aug 2018 08:33 )  PTT:39.9 sec          RADIOLOGY & ADDITIONAL TESTS:      Imaging or report Personally Reviewed:  [ ] YES  [ ] NO    Medications:  Standing  enoxaparin Injectable 40 milliGRAM(s) SubCutaneous every 24 hours  melatonin 5 milliGRAM(s) Oral at bedtime  metoprolol succinate ER 50 milliGRAM(s) Oral daily    PRN Meds      Case discussed with resident    Care discussed with pt/family

## 2018-08-09 NOTE — PROGRESS NOTE ADULT - SUBJECTIVE AND OBJECTIVE BOX
PERRI GROVE 78y Female  MRN#: [id number]       SUBJECTIVE  Patient is a 78y old Female who presents with a chief complaint of severe abdominal pain  Currently admitted to medicine with the primary diagnosis of transaminitis  Today is hospital day 3d, and this morning she reports no overnight events.     OBJECTIVE  PAST MEDICAL & SURGICAL HISTORY  Malignant neoplasm of female breast, unspecified estrogen receptor status, unspecified laterality, unspecified site of breast  Depression, unspecified depression type  Coronary artery disease involving native heart without angina pectoris, unspecified vessel or lesion type  Hypertension, unspecified type    ALLERGIES:  No Known Allergies    MEDICATIONS:  STANDING MEDICATIONS  enoxaparin Injectable 40 milliGRAM(s) SubCutaneous every 24 hours  melatonin 5 milliGRAM(s) Oral at bedtime  metoprolol succinate ER 50 milliGRAM(s) Oral daily    PRN MEDICATIONS      VITAL SIGNS: Last 24 Hours  T(C): 37.1 (09 Aug 2018 14:34), Max: 37.7 (08 Aug 2018 20:00)  T(F): 98.7 (09 Aug 2018 14:34), Max: 99.9 (08 Aug 2018 20:00)  HR: 81 (09 Aug 2018 14:34) (77 - 86)  BP: 131/60 (09 Aug 2018 14:34) (131/60 - 138/65)  BP(mean): --  RR: 18 (09 Aug 2018 14:34) (18 - 18)  SpO2: 95% (09 Aug 2018 08:01) (95% - 95%)    LABS:                        14.3   6.97  )-----------( 146      ( 09 Aug 2018 08:33 )             41.8     08-09    142  |  107  |  14  ----------------------------<  106<H>  4.0   |  20  |  0.6<L>    Ca    8.0<L>      09 Aug 2018 08:33    TPro  6.4  /  Alb  3.9  /  TBili  2.8<H>  /  DBili  2.1<H>  /  AST  274<H>  /  ALT  603<H>  /  AlkPhos  271<H>  08-09    PTT - ( 09 Aug 2018 08:33 )  PTT:39.9 sec      RADIOLOGY:< from: US Abdomen Limited (08.06.18 @ 01:02) >  Gallbladder sludge.     Otherwise unremarkable rightupper quadrant ultrasound.    < end of copied text >    < from: MR Abdomen No Cont (08.07.18 @ 22:38) >  1.  Cholelithiasis without evidence for choledocholithiasis or biliary   ductal dilation; low insertion of the cystic duct which courses posterior   to the common hepatic duct.    2.  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 A follow-up pancreas protocol MRI with   contrast is recommended in one year to document stability.      < end of copied text >        PHYSICAL EXAM:    GENERAL: NAD, well-developed, AAOx3  HEENT:  Atraumatic, Normocephalic. EOMI, PERRLA, conjunctiva and sclera clear, No JVD  PULMONARY: Clear to auscultation bilaterally; No wheeze  CARDIOVASCULAR: Regular rate and rhythm; No murmurs, rubs, or gallops  GASTROINTESTINAL: Soft, Nontender, Nondistended; Bowel sounds present  MUSCULOSKELETAL:  2+ Peripheral Pulses, No clubbing, cyanosis, or edema  NEUROLOGY: non-focal  SKIN: No rashes or lesions      ADMISSION SUMMARY      Patient is a 78y old Female who presents with a chief complaint of severe abdominal pain   Currently admitted to medicine with the primary diagnosis of transaminitis.    ASSESSMENT AND PLAN  #Transaminitis   -Pain abdomen  for 1 day around the umbilicus, non radiating, 8/10 sharp relieved with pain medication.  -Patient had diffuse pain abdomen 2days ago which was relieved with morphine.  -Complained of diffuse abdominal discomfort yesterday.    Abdomen was distended. No guarding or rigidity present. X ray erect abdomen negative for perforation   - liver enzymes trending down, Negative hepatitis panel, negative ASMA, AMA  -Ultrasound abd: GB sludge  -CT abd: no evidence of acute pathology  -MRCP showed cholelithiasis.  -Surgery consulted for atypical biliary colic (as per GI)  -Liver biopsy done by IR today. Patient complained of pain abdomen post surgery with referred pain to right shoulder  -f/u GI and surgery consult  -cannot rule out toxin mediated hepatitis   -f/u  toxicology screen, blood alcohol level, HSV PCR, BARBARA Antibodies   -trend LFTs    #diet:Regular   #dvt prophylaxis enoxaparin 40mg sc q24h  obc

## 2018-08-10 LAB
ALBUMIN SERPL ELPH-MCNC: 3.5 G/DL — SIGNIFICANT CHANGE UP (ref 3.5–5.2)
ALP SERPL-CCNC: 279 U/L — HIGH (ref 30–115)
ALT FLD-CCNC: 465 U/L — HIGH (ref 0–41)
ANA PAT FLD IF-IMP: ABNORMAL
ANA TITR SER: ABNORMAL
ANION GAP SERPL CALC-SCNC: 13 MMOL/L — SIGNIFICANT CHANGE UP (ref 7–14)
AST SERPL-CCNC: 141 U/L — HIGH (ref 0–41)
BILIRUB DIRECT SERPL-MCNC: 0.7 MG/DL — HIGH (ref 0–0.2)
BILIRUB INDIRECT FLD-MCNC: 0.5 MG/DL — SIGNIFICANT CHANGE UP (ref 0.2–1.2)
BILIRUB SERPL-MCNC: 1.2 MG/DL — SIGNIFICANT CHANGE UP (ref 0.2–1.2)
BUN SERPL-MCNC: 18 MG/DL — SIGNIFICANT CHANGE UP (ref 10–20)
CALCIUM SERPL-MCNC: 8.4 MG/DL — LOW (ref 8.5–10.1)
CHLORIDE SERPL-SCNC: 104 MMOL/L — SIGNIFICANT CHANGE UP (ref 98–110)
CO2 SERPL-SCNC: 24 MMOL/L — SIGNIFICANT CHANGE UP (ref 17–32)
CREAT SERPL-MCNC: 0.6 MG/DL — LOW (ref 0.7–1.5)
GLUCOSE SERPL-MCNC: 99 MG/DL — SIGNIFICANT CHANGE UP (ref 70–99)
HCT VFR BLD CALC: 41.5 % — SIGNIFICANT CHANGE UP (ref 37–47)
HGB BLD-MCNC: 13.9 G/DL — SIGNIFICANT CHANGE UP (ref 12–16)
MCHC RBC-ENTMCNC: 27.6 PG — SIGNIFICANT CHANGE UP (ref 27–31)
MCHC RBC-ENTMCNC: 33.5 G/DL — SIGNIFICANT CHANGE UP (ref 32–37)
MCV RBC AUTO: 82.3 FL — SIGNIFICANT CHANGE UP (ref 81–99)
NRBC # BLD: 0 /100 WBCS — SIGNIFICANT CHANGE UP (ref 0–0)
PLATELET # BLD AUTO: 150 K/UL — SIGNIFICANT CHANGE UP (ref 130–400)
POTASSIUM SERPL-MCNC: 4.1 MMOL/L — SIGNIFICANT CHANGE UP (ref 3.5–5)
POTASSIUM SERPL-SCNC: 4.1 MMOL/L — SIGNIFICANT CHANGE UP (ref 3.5–5)
PROT SERPL-MCNC: 6.2 G/DL — SIGNIFICANT CHANGE UP (ref 6–8)
RBC # BLD: 5.04 M/UL — SIGNIFICANT CHANGE UP (ref 4.2–5.4)
RBC # FLD: 14.5 % — SIGNIFICANT CHANGE UP (ref 11.5–14.5)
SODIUM SERPL-SCNC: 141 MMOL/L — SIGNIFICANT CHANGE UP (ref 135–146)
WBC # BLD: 6.87 K/UL — SIGNIFICANT CHANGE UP (ref 4.8–10.8)
WBC # FLD AUTO: 6.87 K/UL — SIGNIFICANT CHANGE UP (ref 4.8–10.8)

## 2018-08-10 RX ORDER — CEFTRIAXONE 500 MG/1
1 INJECTION, POWDER, FOR SOLUTION INTRAMUSCULAR; INTRAVENOUS EVERY 24 HOURS
Qty: 0 | Refills: 0 | Status: DISCONTINUED | OUTPATIENT
Start: 2018-08-11 | End: 2018-08-12

## 2018-08-10 RX ORDER — CEFTRIAXONE 500 MG/1
INJECTION, POWDER, FOR SOLUTION INTRAMUSCULAR; INTRAVENOUS
Qty: 0 | Refills: 0 | Status: DISCONTINUED | OUTPATIENT
Start: 2018-08-10 | End: 2018-08-12

## 2018-08-10 RX ORDER — CEFTRIAXONE 500 MG/1
1 INJECTION, POWDER, FOR SOLUTION INTRAMUSCULAR; INTRAVENOUS ONCE
Qty: 0 | Refills: 0 | Status: COMPLETED | OUTPATIENT
Start: 2018-08-10 | End: 2018-08-10

## 2018-08-10 RX ORDER — CEFPODOXIME PROXETIL 100 MG
200 TABLET ORAL EVERY 12 HOURS
Qty: 0 | Refills: 0 | Status: DISCONTINUED | OUTPATIENT
Start: 2018-08-10 | End: 2018-08-13

## 2018-08-10 RX ADMIN — Medication 200 MILLIGRAM(S): at 17:47

## 2018-08-10 RX ADMIN — ENOXAPARIN SODIUM 40 MILLIGRAM(S): 100 INJECTION SUBCUTANEOUS at 05:22

## 2018-08-10 RX ADMIN — ESCITALOPRAM OXALATE 5 MILLIGRAM(S): 10 TABLET, FILM COATED ORAL at 11:28

## 2018-08-10 RX ADMIN — Medication 5 MILLIGRAM(S): at 21:26

## 2018-08-10 RX ADMIN — Medication 50 MILLIGRAM(S): at 05:21

## 2018-08-10 RX ADMIN — CEFTRIAXONE 100 GRAM(S): 500 INJECTION, POWDER, FOR SOLUTION INTRAMUSCULAR; INTRAVENOUS at 11:26

## 2018-08-10 RX ADMIN — Medication 25 MILLIGRAM(S): at 21:28

## 2018-08-10 NOTE — PROGRESS NOTE ADULT - SUBJECTIVE AND OBJECTIVE BOX
PERRI GROVE  78y Female    INTERVAL HPI/OVERNIGHT EVENTS:  Patient seen and examined. No abdominal pain. No N/V. No shoulder pain    ROS: All other systems are negative.    Vital Signs:    T(F): 97.2 (08-10-18 @ 05:14), Max: 99.7 (08-09-18 @ 20:00)  HR: 80 (08-10-18 @ 05:14) (80 - 100)  BP: 123/57 (08-10-18 @ 05:14) (119/58 - 131/60)  RR: 18 (08-10-18 @ 05:14) (18 - 18)  SpO2: 94% (08-10-18 @ 05:25) (94% - 94%)  I&O's Summary    Daily     Daily   CAPILLARY BLOOD GLUCOSE          PHYSICAL EXAM:    GENERAL:  NAD  SKIN: No rashes or lesions  HENT: Atrumatic. Normocephalic. PERRL. Moist membranes.  NECK: Supple, No JVD. No lymphadenopathy.  PULMONARY: CTA B/L. No wheezing. No rales  CVS: Normal S1, S2. Rate and Rythm are regular. No murmurs.  ABDOMEN/GI: Soft, Nontender, Nondistended; BS present  EXTREMITIES: Peripheral pulses intact. No edema B/L LE.  NEUROLOGIC:  No motor or sensory deficit.  PSYCH: Alert & oriented x 3    Consultant(s) Notes Reviewed:  [x ] YES  [ ] NO  Care Discussed with Consultants/Other Providers [ x] YES  [ ] NO    LABS:                        13.9   6.87  )-----------( 150      ( 10 Aug 2018 08:18 )             41.5     08-10    141  |  104  |  18  ----------------------------<  99  4.1   |  24  |  0.6<L>    Ca    8.4<L>      10 Aug 2018 08:18    TPro  6.2  /  Alb  3.5  /  TBili  1.2  /  DBili  0.7<H>  /  AST  141<H>  /  ALT  465<H>  /  AlkPhos  279<H>  08-10    PTT - ( 09 Aug 2018 08:33 )  PTT:39.9 sec          RADIOLOGY & ADDITIONAL TESTS:      Imaging or report Personally Reviewed:  [ ] YES  [ ] NO    Medications:  Standing  cefTRIAXone   IVPB      cefTRIAXone   IVPB 1 Gram(s) IV Intermittent once  enoxaparin Injectable 40 milliGRAM(s) SubCutaneous every 24 hours  escitalopram 5 milliGRAM(s) Oral daily  melatonin 5 milliGRAM(s) Oral at bedtime  metoprolol succinate ER 50 milliGRAM(s) Oral daily  traZODone 25 milliGRAM(s) Oral two times a day    PRN Meds      Case discussed with resident    Care discussed with pt/family

## 2018-08-10 NOTE — PROGRESS NOTE ADULT - SUBJECTIVE AND OBJECTIVE BOX
Progress Note: Trauma Surgery  Patient: PERRI GROVE , 78y (1940)Female   MRN: [id number]  Location: [address]  Visit: 08-06-18 Inpatient  Date: 08-10-18 @ 01:30  Hospital Day: 5  Post-op Day:     Procedure/Injury: abdominal pain, cholelithiasis  Events over 24h: No acute overnight events.  Bowel function: Bowel movement [  ] Flatus [  ]    Vitals: T(F): 99.7 (08-09-18 @ 20:00), Max: 99.7 (08-09-18 @ 20:00)  HR: 100 (08-09-18 @ 20:00)  BP: 119/58 (08-09-18 @ 20:00) (119/58 - 138/65)  RR: 18 (08-09-18 @ 20:00)  SpO2: 95% (08-09-18 @ 08:01)    In:   Out:   Net:   Diet: Diet, Regular:   DASH/TLC Sodium & Cholesterol Restricted  Low Fat (LOWFAT) (08-06-18 @ 09:47)  Diet, NPO after Midnight:      NPO Start Date: 08-Aug-2018,   NPO Start Time: 23:59  Except Medications (08-08-18 @ 16:01)      Physical Examination:  General: NAD, AAOx3, GCS 15/15  HEENT: NCAT, INOCENCIO, WNL  Heart: S1 S2, No MRG RRR   Lungs: CTABL +BS Equal BL  Abdomen:  +BS, soft, mild tenderness in RUQ, nondistended  Skin: Warm/dry, Normal color, No jaundice.       Medications: [Standing]  enoxaparin Injectable 40 milliGRAM(s) SubCutaneous every 24 hours  escitalopram 5 milliGRAM(s) Oral daily  melatonin 5 milliGRAM(s) Oral at bedtime  metoprolol succinate ER 50 milliGRAM(s) Oral daily  traZODone 25 milliGRAM(s) Oral two times a day    Medications:[PRN]    Labs:                        14.3   6.97  )-----------( 146      ( 09 Aug 2018 08:33 )             41.8     08-09    142  |  107  |  14  ----------------------------<  106<H>  4.0   |  20  |  0.6<L>    Ca    8.0<L>      09 Aug 2018 08:33    TPro  6.4  /  Alb  3.9  /  TBili  2.8<H>  /  DBili  2.1<H>  /  AST  274<H>  /  ALT  603<H>  /  AlkPhos  271<H>  08-09    LIVER FUNCTIONS - ( 09 Aug 2018 08:33 )  Alb: 3.9 g/dL / Pro: 6.4 g/dL / ALK PHOS: 271 U/L / ALT: 603 U/L / AST: 274 U/L / GGT: x           PTT - ( 09 Aug 2018 08:33 )  PTT:39.9 sec        Imaging:  None/24h        Dispo:  Seen and evaluated by PT: Yes [  ] No [  ]  Physiatry reccomendations: Home with VNS [  ] , SNF/STR [  ] , Inpatient rehab [  ] , No needs + D/C home [  ]  SW:     Date/Time: 08-10-18 @ 01:30

## 2018-08-10 NOTE — PROGRESS NOTE ADULT - ASSESSMENT
77 y/o F with PMH of HTN, CAD with stent, depression ,breast ca in remission presents with acute onset abdominal pain around umbilicus area, found to have elevated liver enzymes on admission.    1.	Abdominal pain - resolved  2.	Abnormal Liver function study  3.	CAD/HTN  4.	UTI         PLAN:    ·	S/P Liver biopsy. Follow biopsy report and LFT'S  ·	AST, ALT trending down but ALP remains elevated.  ·	MRCP unremarkable  ·	Mitochondrial and smooth muscle antibodies are negative.  ·	Hep profile is also negative.  ·	Follow liver biopsy  ·	Cont Metoprolol  ·	Will watch LFT'S and wait for biopsy report  ·	GI F/U noted.  ·	Check CA 19-9 and CEA level  ·	Start her on Rocephin 1 gm IVPB q 24h. Check cxs

## 2018-08-10 NOTE — PROGRESS NOTE ADULT - SUBJECTIVE AND OBJECTIVE BOX
PERRI GROVE 78y Female  MRN#: [id number]       SUBJECTIVE  Patient is a 78y old Female who presents with a chief complaint of severe abdominal pain   Currently admitted to medicine with the primary diagnosis of transaminitis  Today is hospital day 4d, and this morning she reports no overnight events.     OBJECTIVE  PAST MEDICAL & SURGICAL HISTORY  Malignant neoplasm of female breast, unspecified estrogen receptor status, unspecified laterality, unspecified site of breast  Depression, unspecified depression type  Coronary artery disease involving native heart without angina pectoris, unspecified vessel or lesion type  Hypertension, unspecified type    ALLERGIES:  No Known Allergies    MEDICATIONS:  STANDING MEDICATIONS  cefpodoxime 200 milliGRAM(s) Oral every 12 hours  cefTRIAXone   IVPB      enoxaparin Injectable 40 milliGRAM(s) SubCutaneous every 24 hours  escitalopram 5 milliGRAM(s) Oral daily  melatonin 5 milliGRAM(s) Oral at bedtime  metoprolol succinate ER 50 milliGRAM(s) Oral daily  traZODone 25 milliGRAM(s) Oral two times a day    PRN MEDICATIONS      VITAL SIGNS: Last 24 Hours  T(C): 37 (10 Aug 2018 21:25), Max: 37 (10 Aug 2018 21:25)  T(F): 98.6 (10 Aug 2018 21:25), Max: 98.6 (10 Aug 2018 21:25)  HR: 78 (10 Aug 2018 21:25) (72 - 80)  BP: 121/58 (10 Aug 2018 21:25) (114/53 - 123/57)  BP(mean): --  RR: 18 (10 Aug 2018 21:25) (18 - 18)  SpO2: 94% (10 Aug 2018 05:25) (94% - 94%)    LABS:                        13.9   6.87  )-----------( 150      ( 10 Aug 2018 08:18 )             41.5     0810    141  |  104  |  18  ----------------------------<  99  4.1   |  24  |  0.6<L>    Ca    8.4<L>      10 Aug 2018 08:18    TPro  6.2  /  Alb  3.5  /  TBili  1.2  /  DBili  0.7<H>  /  AST  141<H>  /  ALT  465<H>  /  AlkPhos  279<H>  0810    PTT - ( 09 Aug 2018 08:33 )  PTT:39.9 sec  Urinalysis Basic - ( 09 Aug 2018 16:22 )    Color: Orange / Appearance: Cloudy / S.025 / pH: x  Gluc: x / Ketone: 15  / Bili: Large / Urobili: 2.0 mg/dL   Blood: x / Protein: 30 mg/dL / Nitrite: Positive   Leuk Esterase: Moderate / RBC: x / WBC 10-25 /HPF   Sq Epi: x / Non Sq Epi: Few /HPF / Bacteria: TNTC /HPF      RADIOLOGY:< from: MR Abdomen No Cont (18 @ 22:38) >  1.  Cholelithiasis without evidence for choledocholithiasis or biliary   ductal dilation; low insertion of the cystic duct which courses posterior   to the common hepatic duct.    2.  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 A follow-up pancreas protocol MRI with   contrast is recommended in one year to document stability.    < end of copied text >      < from: US Abdomen Limited (18 @ 01:02) >  Gallbladder sludge.     Otherwise unremarkable rightupper quadrant ultrasound.      < end of copied text >      < from: Xray Abdomen 2 Views (18 @ 21:17) >  Impression    New dilated loop of small bowel in the left abdomen, which may represent   ileus versus small bowel obstruction. Follow-up is recommended.      < end of copied text >        PHYSICAL EXAM:    GENERAL: NAD, well-developed, AAOx3  HEENT:  Atraumatic, Normocephalic. EOMI, PERRLA, conjunctiva and sclera clear, No JVD  PULMONARY: Clear to auscultation bilaterally; No wheeze  CARDIOVASCULAR: Regular rate and rhythm; No murmurs, rubs, or gallops  GASTROINTESTINAL: Soft, Nontender, Nondistended; Bowel sounds present  MUSCULOSKELETAL:  2+ Peripheral Pulses, No clubbing, cyanosis, or edema  NEUROLOGY: non-focal  SKIN: No rashes or lesions      ADMISSION SUMMARY  Patient is a 78y old Female who presents with a chief complaint of severe abdominal pain  Currently admitted to medicine with the primary diagnosis of transaminitis      ASSESSMENT & PLAN    #Transaminitis   -Pain abdomen  for 1 day around the umbilicus, non radiating, 8/10 sharp relieved with pain medication.  -Patient had diffuse pain abdomen 2days ago which was relieved with morphine.   -X ray erect abdomen negative for perforation but showed dilated bowel loops suggestive of SBO vs ileus. But patient has passed stool today and no nausea or vomiting present. Abdomen is soft, not distended. No guarding or rigidity present.   - liver enzymes trending down, Alkaline phosphatase elevated, positive AMA 1:160  -Negative hepatitis panel, negative ASMA, AMA  -Ultrasound abd: GB sludge  -CT abd: no evidence of acute pathology  -MRCP showed cholelithiasis.  -Surgery consulted for atypical biliary colic (as per GI)  -Liver biopsy done by IR .Patient complained of pain abdomen post surgery with referred pain to right shoulder. relieved with morphine  -f/u GI and surgery consult  -cannot rule out toxin mediated hepatitis   -f/u  toxicology screen, blood alcohol level, HSV PCR   -trend LFTs    #Acute cystitis  - Positive UA and UC for E.coli   -Started on Cefpodoxime     #diet:Regular   #dvt prophylaxis enoxaparin 40mg sc q24h  obc

## 2018-08-10 NOTE — PROGRESS NOTE ADULT - ASSESSMENT
Assessment:  78y Female patient admitted S/P *** , with the above physical exam, labs, and imaging findings.    Plan:  -f/u core needle bx  -continue care per medical team  -no acute surgical intervention at this time  -patient may f/u outpatient for elective cholecystectomy if she develops biliary colic

## 2018-08-10 NOTE — PROGRESS NOTE ADULT - ATTENDING COMMENTS
Pt seen and examined with GI fellow.  LFTs fluctuating but still significantly elevated.  Liver biopsy done.  Liver serologies unrevealing thus far except an BARBARA which is positive at 1:160, but the ASMA is negative.  Will await liver biopsy results for definitive diagnosis but AICAH should be kept in mind.

## 2018-08-10 NOTE — PROGRESS NOTE ADULT - ASSESSMENT
77 yo F w/ h/o HTN, CAD s/p stent(11 yrs ago), depression, breast ca s/p lumpectomy 10 yrs ago-in remission; presented from home on 08/05 w/ c/o sudden onset abdominal pain in periumbilical region    1-Acute recurrent abdominal pain with increase liver enzymes rule out  parenchymal etiologies    Liver enzymes trending down  IR guided  liver biopsy done yesterday, results pending   MRCP showed cholelithiasis but no CBD stone or dilatation  CLD workup negative so Far  follow liver enzymes  Patient has a positive U/A and abdominal pain, please get urine culture and start antibiotics for UTI   supportive care       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 79 yo F w/ h/o HTN, CAD s/p stent(11 yrs ago), depression, breast ca s/p lumpectomy 10 yrs ago-in remission; presented from home on 08/05 w/ c/o sudden onset abdominal pain in periumbilical region    1-Acute recurrent abdominal pain with increase liver enzymes rule out  parenchymal etiologies    Liver enzymes trending down  IR guided  liver biopsy done yesterday, results pending   MRCP showed cholelithiasis but no CBD stone or dilatation  CLD workup negative so Far except elevated BARBARA  follow liver enzymes  Patient has a positive U/A and abdominal pain, please get urine culture and start antibiotics for UTI   supportive care       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

## 2018-08-10 NOTE — PROGRESS NOTE ADULT - SUBJECTIVE AND OBJECTIVE BOX
GI HPI Today:  Patient feels better today, Denies any abdominal pain, vomiting diarrhea, or nausea      Hospital course:  77 y/o F with PMH of HTN, CAD with stent, depression ,breast ca in remission presents with acute onset abdominal pain.  Pt reports severe abdominal pain , 8/10,sharp, non radiating, around umbilicus area since afternoon. Pain started after eating a chocolate milkshake. Denies any associated symptoms. Denies nausea/vomiting/fever/chills/diarrhea/urinary symptoms/flank pain .  Denies CP/sob/cough/palpitations/heaache/travel hx /sick contact. ROS otherwise negative. (06 Aug 2018 02:40)      PAST MEDICAL & SURGICAL HISTORY  Malignant neoplasm of female breast, unspecified estrogen receptor status, unspecified laterality, unspecified site of breast  Depression, unspecified depression type  Coronary artery disease involving native heart without angina pectoris, unspecified vessel or lesion type  Hypertension, unspecified type      ALLERGIES:  No Known Allergies      MEDICATIONS:  MEDICATIONS  (STANDING):  enoxaparin Injectable 40 milliGRAM(s) SubCutaneous every 24 hours  escitalopram 5 milliGRAM(s) Oral daily  melatonin 5 milliGRAM(s) Oral at bedtime  metoprolol succinate ER 50 milliGRAM(s) Oral daily  traZODone 25 milliGRAM(s) Oral two times a day    MEDICATIONS  (PRN):      REVIEW OF SYSTEMS:    CONSTITUTIONAL: No weakness, fatigue, malaise,   Throat: No Dysphagia, No Odynophagia  RESPIRATORY: No SOB  CARDIOVASCULAR: No chest pain   Muscloskeletal: no joints pain  NEUROLOGICAL: No syncope or diziness  SKIN: No pruritis  Heme/Lymph: no LN enlargement         VITALS:   T(F): 97.2 (08-10 @ 05:14), Max: 99.9 (08-08 @ 20:00)  HR: 80 (08-10 @ 05:14) (75 - 100)  BP: 123/57 (08-10 @ 05:14) (119/58 - 184/81)  BP(mean): --  RR: 18 (08-10 @ 05:14) (18 - 18)  SpO2: 94% (08-10 @ 05:25) (94% - 95%)        PHYSICAL EXAM:  EYES: No scleral icterus   LUNG: Clear to auscultation bilaterally; No rales, rhonchi, wheezing, or rubs  HEART: RRR; No murmurs  ABDOMEN: Soft, +BS, no Abdominal Tenderness, No guarding, No Arenas Sign   Rectal Exam: not performed     Blood Work :                        14.3   6.97  )-----------( 146      ( 09 Aug 2018 08:33 )             41.8     PT/INR - ( 07 Aug 2018 11:57 )  INR: 1.22          PTT - ( 09 Aug 2018 08:33 )  PTT:39.9<H>  08-09    142  |  107  |  14  ----------------------------<  106<H>  4.0   |  20  |  0.6<L>    Ca    8.0<L>      09 Aug 2018 08:33  Mg     2.2     08-07      CBC -  ( 09 Aug 2018 08:33 )  Hemoglobin : 14.3    CBC -  ( 08 Aug 2018 07:45 )  Hemoglobin : 14.5    CBC -  ( 07 Aug 2018 08:53 )  Hemoglobin : 14.1      LIVER FUNCTIONS - ( 09 Aug 2018 08:33 )  Alb: 3.9 [3.5 - 5.2] / Pro: 6.4 [6.0 - 8.0] / ALK PHOS: 271 [30 - 115] / ALT: 603 [0 - 41] / AST: 274 [0 - 41] / GGT: x     LIVER FUNCTIONS - ( 08 Aug 2018 07:45 )  Alb: 3.7 [3.5 - 5.2] / Pro: 6.3 [6.0 - 8.0] / ALK PHOS: 233 [30 - 115] / ALT: 723 [0 - 41] / AST: 372 [0 - 41] / GGT: x     LIVER FUNCTIONS - ( 07 Aug 2018 08:53 )  Alb: 4.0 [3.5 - 5.2] / Pro: 6.6 [6.0 - 8.0] / ALK PHOS: 252 [30 - 115] / ALT: >700 [0 - 41] / AST: 567 [0 - 41] / GGT: x     LIVER FUNCTIONS - ( 06 Aug 2018 17:12 )  Alb: 4.0 [3.5 - 5.2] / Pro: 6.6 [6.0 - 8.0] / ALK PHOS: 233 [30 - 115] / ALT: 1142.0 [0 - 41] / AST: 928.5 [0 - 41] / GGT: x     LIVER FUNCTIONS - ( 06 Aug 2018 07:12 )  Alb: 3.8 [3.5 - 5.2] / Pro: 6.3 [6.0 - 8.0] / ALK PHOS: 194 [30 - 115] / ALT: 1158 [0 - 41] / AST: 1397 [0 - 41] / GGT: 334 [1 - 40]     LIVER FUNCTIONS - ( 05 Aug 2018 20:21 )  Alb: 4.0 [3.5 - 5.2] / Pro: 6.8 [6.0 - 8.0] / ALK PHOS: 157 [30 - 115] / ALT: 329 [0 - 41] / AST: 617 [0 - 41] / GGT: x     LIVER FUNCTIONS - ( 05 Aug 2018 17:10 )  Alb: 4.0 [3.5 - 5.2] / Pro: 6.7 [6.0 - 8.0] / ALK PHOS: 128 [30 - 115] / ALT: 78 [0 - 41] / AST: 171 [0 - 41] / GGT: x

## 2018-08-11 LAB
ALBUMIN SERPL ELPH-MCNC: 3.3 G/DL — LOW (ref 3.5–5.2)
ALP SERPL-CCNC: 250 U/L — HIGH (ref 30–115)
ALT FLD-CCNC: 325 U/L — HIGH (ref 0–41)
ANION GAP SERPL CALC-SCNC: 13 MMOL/L — SIGNIFICANT CHANGE UP (ref 7–14)
AST SERPL-CCNC: 84 U/L — HIGH (ref 0–41)
BILIRUB SERPL-MCNC: 0.7 MG/DL — SIGNIFICANT CHANGE UP (ref 0.2–1.2)
BUN SERPL-MCNC: 19 MG/DL — SIGNIFICANT CHANGE UP (ref 10–20)
CALCIUM SERPL-MCNC: 8.4 MG/DL — LOW (ref 8.5–10.1)
CANCER AG19-9 SERPL-ACNC: 262.9 U/ML — HIGH
CHLORIDE SERPL-SCNC: 105 MMOL/L — SIGNIFICANT CHANGE UP (ref 98–110)
CO2 SERPL-SCNC: 23 MMOL/L — SIGNIFICANT CHANGE UP (ref 17–32)
CREAT SERPL-MCNC: 0.6 MG/DL — LOW (ref 0.7–1.5)
GLUCOSE SERPL-MCNC: 114 MG/DL — HIGH (ref 70–99)
HCT VFR BLD CALC: 37.6 % — SIGNIFICANT CHANGE UP (ref 37–47)
HGB BLD-MCNC: 12.7 G/DL — SIGNIFICANT CHANGE UP (ref 12–16)
MCHC RBC-ENTMCNC: 27.7 PG — SIGNIFICANT CHANGE UP (ref 27–31)
MCHC RBC-ENTMCNC: 33.8 G/DL — SIGNIFICANT CHANGE UP (ref 32–37)
MCV RBC AUTO: 82.1 FL — SIGNIFICANT CHANGE UP (ref 81–99)
NRBC # BLD: 0 /100 WBCS — SIGNIFICANT CHANGE UP (ref 0–0)
PLATELET # BLD AUTO: 144 K/UL — SIGNIFICANT CHANGE UP (ref 130–400)
POTASSIUM SERPL-MCNC: 3.8 MMOL/L — SIGNIFICANT CHANGE UP (ref 3.5–5)
POTASSIUM SERPL-SCNC: 3.8 MMOL/L — SIGNIFICANT CHANGE UP (ref 3.5–5)
PROT SERPL-MCNC: 5.8 G/DL — LOW (ref 6–8)
RBC # BLD: 4.58 M/UL — SIGNIFICANT CHANGE UP (ref 4.2–5.4)
RBC # FLD: 14.5 % — SIGNIFICANT CHANGE UP (ref 11.5–14.5)
SODIUM SERPL-SCNC: 141 MMOL/L — SIGNIFICANT CHANGE UP (ref 135–146)
WBC # BLD: 7.33 K/UL — SIGNIFICANT CHANGE UP (ref 4.8–10.8)
WBC # FLD AUTO: 7.33 K/UL — SIGNIFICANT CHANGE UP (ref 4.8–10.8)

## 2018-08-11 RX ORDER — TRAZODONE HCL 50 MG
25 TABLET ORAL AT BEDTIME
Qty: 0 | Refills: 0 | Status: DISCONTINUED | OUTPATIENT
Start: 2018-08-11 | End: 2018-08-14

## 2018-08-11 RX ADMIN — Medication 50 MILLIGRAM(S): at 06:12

## 2018-08-11 RX ADMIN — ESCITALOPRAM OXALATE 5 MILLIGRAM(S): 10 TABLET, FILM COATED ORAL at 11:32

## 2018-08-11 RX ADMIN — Medication 200 MILLIGRAM(S): at 06:12

## 2018-08-11 RX ADMIN — Medication 200 MILLIGRAM(S): at 17:34

## 2018-08-11 RX ADMIN — Medication 5 MILLIGRAM(S): at 22:04

## 2018-08-11 RX ADMIN — ENOXAPARIN SODIUM 40 MILLIGRAM(S): 100 INJECTION SUBCUTANEOUS at 06:13

## 2018-08-11 RX ADMIN — Medication 25 MILLIGRAM(S): at 22:04

## 2018-08-11 NOTE — PROGRESS NOTE ADULT - SUBJECTIVE AND OBJECTIVE BOX
PERRI GROVE  78y Female    INTERVAL HPI/OVERNIGHT EVENTS:    No complaints. Sitting in a chair. Has BM's    T(F): 98 (08-11-18 @ 13:39), Max: 98.6 (08-10-18 @ 21:25)  HR: 85 (08-11-18 @ 13:39) (75 - 85)  BP: 107/52 (08-11-18 @ 13:39) (107/52 - 121/58)  RR: 18 (08-11-18 @ 13:39) (18 - 18)  SpO2: 94% (08-11-18 @ 07:52) (94% - 94%)      PHYSICAL EXAM:  GENERAL: NAD  HEAD:  Normocephalic  EYES:  conjunctiva and sclera clear  ENMT: Moist mucous membranes  NECK: Supple  NERVOUS SYSTEM:  Alert & Oriented X3, Good concentration  CHEST/LUNG: Clear to percussion bilaterally; No rales, rhonchi, wheezing  HEART: Regular rate and rhythm; No murmurs  ABDOMEN: Soft, Nontender, Nondistended; Bowel sounds present  EXTREMITIES:   No edema  SKIN: No rashes     Consultant(s) Notes Reviewed:  [x ] YES  [ ] NO  Care Discussed with Consultants/Other Providers [ x] YES  [ ] NO    MEDICATIONS  (STANDING):  cefpodoxime 200 milliGRAM(s) Oral every 12 hours  cefTRIAXone   IVPB      cefTRIAXone   IVPB 1 Gram(s) IV Intermittent every 24 hours  enoxaparin Injectable 40 milliGRAM(s) SubCutaneous every 24 hours  escitalopram 5 milliGRAM(s) Oral daily  melatonin 5 milliGRAM(s) Oral at bedtime  metoprolol succinate ER 50 milliGRAM(s) Oral daily  traZODone 25 milliGRAM(s) Oral two times a day    MEDICATIONS  (PRN):      LABS:                        12.7   7.33  )-----------( 144      ( 11 Aug 2018 06:05 )             37.6     08-11    141  |  105  |  19  ----------------------------<  114<H>  3.8   |  23  |  0.6<L>    Ca    8.4<L>      11 Aug 2018 06:05    TPro  5.8<L>  /  Alb  3.3<L>  /  TBili  0.7  /  DBili  x   /  AST  84<H>  /  ALT  325<H>  /  AlkPhos  250<H>  08-11    Cancer Antigen, GI Ca 19-9 (08.10.18 @ 16:00)    Cancer Antigen, GI Ca 19-9: 262.9: METHOD: [employer] Chemiluminescent Immunoassay  Values obtained with different assay methods or kits cannot be used  interchangeably.  Results cannot be interpreted as absolute evidence of the presence or  absence of malignant disease. U/mL    Smooth Muscle Antibody (08.07.18 @ 11:57)    Smooth Muscle Antibody: <1:20: Method: Indirect fluorescent antibody.    Mitochondrial Antibody (08.07.18 @ 11:57)    Mitochondrial Antibody: <1:20: Method: Indirect fluorescent antibody.    Anti-Nuclear Antibody (08.07.18 @ 11:57)    Anti Nuclear Factor Titer: 1:160    BARBARA Pattern: Homogeneous        RADIOLOGY & ADDITIONAL TESTS:    Imaging or report Personally Reviewed:  [x ] YES  [ ] NO    < from: Xray Abdomen 2 Views (08.08.18 @ 21:17) >  Impression    New dilated loop of small bowel in the left abdomen, which may represent   ileus versus small bowel obstruction. Follow-up is recommended.    < end of copied text >          Care discussed with pt

## 2018-08-11 NOTE — PROGRESS NOTE ADULT - ASSESSMENT
77 y/o F with PMH of HTN, CAD with stent, depression ,breast ca in remission presents with acute onset abdominal pain around umbilicus area, found to have elevated liver enzymes on admission.    1.	Abdominal pain - resolved  2.	Abnormal Liver function study  3.	CAD/HTN  4.	UTI         PLAN:    ·	S/P Liver biopsy. Follow biopsy report. LFTs improving.  ·	MRCP unremarkable  ·	Mitochondrial and smooth muscle antibodies are negative. (+ BARBARA)  ·	Hep profile is negative.  ·	Elevated CA 19-9 level - GI f/u - rule out malignancy  ·	Cont Metoprolol  ·	Check CEA level  ·	Rocephin 1 gm IVPB q 24h and f/u urine culture

## 2018-08-12 LAB
ALBUMIN SERPL ELPH-MCNC: 3.4 G/DL — LOW (ref 3.5–5.2)
ALP SERPL-CCNC: 239 U/L — HIGH (ref 30–115)
ALT FLD-CCNC: 247 U/L — HIGH (ref 0–41)
ANION GAP SERPL CALC-SCNC: 17 MMOL/L — HIGH (ref 7–14)
AST SERPL-CCNC: 62 U/L — HIGH (ref 0–41)
BILIRUB SERPL-MCNC: 0.6 MG/DL — SIGNIFICANT CHANGE UP (ref 0.2–1.2)
BUN SERPL-MCNC: 17 MG/DL — SIGNIFICANT CHANGE UP (ref 10–20)
CALCIUM SERPL-MCNC: 8.4 MG/DL — LOW (ref 8.5–10.1)
CEA SERPL-MCNC: 3.3 NG/ML — SIGNIFICANT CHANGE UP (ref 0–3.8)
CHLORIDE SERPL-SCNC: 107 MMOL/L — SIGNIFICANT CHANGE UP (ref 98–110)
CO2 SERPL-SCNC: 21 MMOL/L — SIGNIFICANT CHANGE UP (ref 17–32)
CREAT SERPL-MCNC: 0.5 MG/DL — LOW (ref 0.7–1.5)
GLUCOSE SERPL-MCNC: 99 MG/DL — SIGNIFICANT CHANGE UP (ref 70–99)
HCT VFR BLD CALC: 37.3 % — SIGNIFICANT CHANGE UP (ref 37–47)
HGB BLD-MCNC: 12.4 G/DL — SIGNIFICANT CHANGE UP (ref 12–16)
MCHC RBC-ENTMCNC: 27.4 PG — SIGNIFICANT CHANGE UP (ref 27–31)
MCHC RBC-ENTMCNC: 33.2 G/DL — SIGNIFICANT CHANGE UP (ref 32–37)
MCV RBC AUTO: 82.5 FL — SIGNIFICANT CHANGE UP (ref 81–99)
NRBC # BLD: 0 /100 WBCS — SIGNIFICANT CHANGE UP (ref 0–0)
PLATELET # BLD AUTO: 139 K/UL — SIGNIFICANT CHANGE UP (ref 130–400)
POTASSIUM SERPL-MCNC: 4.4 MMOL/L — SIGNIFICANT CHANGE UP (ref 3.5–5)
POTASSIUM SERPL-SCNC: 4.4 MMOL/L — SIGNIFICANT CHANGE UP (ref 3.5–5)
PROT SERPL-MCNC: 6 G/DL — SIGNIFICANT CHANGE UP (ref 6–8)
RBC # BLD: 4.52 M/UL — SIGNIFICANT CHANGE UP (ref 4.2–5.4)
RBC # FLD: 14.7 % — HIGH (ref 11.5–14.5)
SODIUM SERPL-SCNC: 145 MMOL/L — SIGNIFICANT CHANGE UP (ref 135–146)
WBC # BLD: 7.01 K/UL — SIGNIFICANT CHANGE UP (ref 4.8–10.8)
WBC # FLD AUTO: 7.01 K/UL — SIGNIFICANT CHANGE UP (ref 4.8–10.8)

## 2018-08-12 RX ADMIN — Medication 5 MILLIGRAM(S): at 21:05

## 2018-08-12 RX ADMIN — Medication 50 MILLIGRAM(S): at 05:33

## 2018-08-12 RX ADMIN — ESCITALOPRAM OXALATE 5 MILLIGRAM(S): 10 TABLET, FILM COATED ORAL at 11:15

## 2018-08-12 RX ADMIN — ENOXAPARIN SODIUM 40 MILLIGRAM(S): 100 INJECTION SUBCUTANEOUS at 05:34

## 2018-08-12 RX ADMIN — Medication 200 MILLIGRAM(S): at 05:33

## 2018-08-12 RX ADMIN — Medication 25 MILLIGRAM(S): at 21:05

## 2018-08-12 RX ADMIN — Medication 200 MILLIGRAM(S): at 17:28

## 2018-08-12 NOTE — PROVIDER CONTACT NOTE (MEDICATION) - ASSESSMENT
Patient refuses new IV line and was educated on the importances of inserting a new one since she has IV ABX treatment order. She refuses all IV ABX treatment. Dr. ANDRADE came to bedside to speak to patient and she still refused.

## 2018-08-12 NOTE — PROGRESS NOTE ADULT - ASSESSMENT
77 y/o F with PMH of HTN, CAD with stent, depression ,breast ca in remission presents with acute onset abdominal pain around umbilicus area, found to have elevated liver enzymes on admission.    1.	Abdominal pain - resolved  2.	Abnormal Liver function study  3.	CAD/HTN  4.	UTI         PLAN:    ·	S/P Liver biopsy. Follow biopsy report. LFTs improving.  ·	MRCP unremarkable  ·	Mitochondrial and smooth muscle antibodies are negative. (+ BARBARA)  ·	Hep profile is negative.  ·	Elevated CA 19-9 level - GI f/u - rule out malignancy  ·	Cont Metoprolol  ·	continue cefpodoxime and f/u urine culture

## 2018-08-12 NOTE — PROVIDER CONTACT NOTE (MEDICATION) - SITUATION
Patient IV does no longer work and needed to be removed. Explained to Patient importances of inserting a new IV line

## 2018-08-12 NOTE — PROGRESS NOTE ADULT - SUBJECTIVE AND OBJECTIVE BOX
PERRI GROVE  78y Female    INTERVAL HPI/OVERNIGHT EVENTS:    No complaints. Refuses IV abx - changed to PO abx.    T(F): 97.6 (08-12-18 @ 05:12), Max: 99.2 (08-11-18 @ 20:34)  HR: 70 (08-12-18 @ 05:12) (70 - 85)  BP: 137/63 (08-12-18 @ 05:12) (107/52 - 137/63)  RR: 18 (08-12-18 @ 05:12) (18 - 18)  SpO2: 95% (08-11-18 @ 22:00) (95% - 95%)    PHYSICAL EXAM:  GENERAL: NAD  HEAD:  Normocephalic  EYES:  conjunctiva and sclera clear  ENMT: Moist mucous membranes  NECK: Supple  NERVOUS SYSTEM:  Alert & Oriented X3, Good concentration  CHEST/LUNG: Clear to percussion bilaterally; No rales, rhonchi, wheezing  HEART: Regular rate and rhythm; No murmurs  ABDOMEN: Soft, Nontender, Nondistended; Bowel sounds present  EXTREMITIES:   No edema  SKIN: No rashes     Consultant(s) Notes Reviewed:  [x ] YES  [ ] NO  Care Discussed with Consultants/Other Providers [ x] YES  [ ] NO    MEDICATIONS  (STANDING):  cefpodoxime 200 milliGRAM(s) Oral every 12 hours  cefTRIAXone   IVPB      cefTRIAXone   IVPB 1 Gram(s) IV Intermittent every 24 hours  enoxaparin Injectable 40 milliGRAM(s) SubCutaneous every 24 hours  escitalopram 5 milliGRAM(s) Oral daily  melatonin 5 milliGRAM(s) Oral at bedtime  metoprolol succinate ER 50 milliGRAM(s) Oral daily  traZODone 25 milliGRAM(s) Oral at bedtime    MEDICATIONS  (PRN):      LABS:                        12.4   7.01  )-----------( 139      ( 12 Aug 2018 06:57 )             37.3     08-12    145  |  107  |  17  ----------------------------<  99  4.4   |  21  |  0.5<L>    Ca    8.4<L>      12 Aug 2018 06:57    TPro  6.0  /  Alb  3.4<L>  /  TBili  0.6  /  DBili  x   /  AST  62<H>  /  ALT  247<H>  /  AlkPhos  239<H>  08-12      Carcinoembryonic Antigen (08.10.18 @ 16:00)    Carcinoembryonic Antigen: 3.3: METHOD: [employer] EIA   The CEA assay should not be used as a cancer screening test. Serum CEA  concentrations should only be used in conjunction with   information available from the clinical evaluation of the patien and  from other diagnostic procedures.   CEA Normal Ranges   _________________   Non-smoker: less than 3.9 ng/mL       Smoker: less than 5.5 ng/mL ng/mL        Case discussed with resident    Care discussed with pt

## 2018-08-12 NOTE — PROGRESS NOTE ADULT - SUBJECTIVE AND OBJECTIVE BOX
PERRI GROVE 78y Female  MRN#: [id number]   CODE STATUS:________      SUBJECTIVE  Patient is a 78y old Female who presents with a chief complaint of severe abdominal pain   Currently admitted to medicine with the primary diagnosis of transaminitis  Today is hospital day 6d, and this morning she reports no overnight events.       OBJECTIVE  PAST MEDICAL & SURGICAL HISTORY  Malignant neoplasm of female breast, unspecified estrogen receptor status, unspecified laterality, unspecified site of breast  Depression, unspecified depression type  Coronary artery disease involving native heart without angina pectoris, unspecified vessel or lesion type  Hypertension, unspecified type    ALLERGIES:  No Known Allergies    MEDICATIONS:  STANDING MEDICATIONS  cefpodoxime 200 milliGRAM(s) Oral every 12 hours  enoxaparin Injectable 40 milliGRAM(s) SubCutaneous every 24 hours  escitalopram 5 milliGRAM(s) Oral daily  melatonin 5 milliGRAM(s) Oral at bedtime  metoprolol succinate ER 50 milliGRAM(s) Oral daily  traZODone 25 milliGRAM(s) Oral at bedtime    PRN MEDICATIONS      VITAL SIGNS: Last 24 Hours  T(C): 36.4 (12 Aug 2018 13:23), Max: 37.3 (11 Aug 2018 20:34)  T(F): 97.6 (12 Aug 2018 13:23), Max: 99.2 (11 Aug 2018 20:34)  HR: 79 (12 Aug 2018 13:23) (70 - 79)  BP: 108/53 (12 Aug 2018 13:23) (108/53 - 137/63)  BP(mean): --  RR: 18 (12 Aug 2018 13:23) (18 - 18)  SpO2: 96% (12 Aug 2018 11:16) (95% - 96%)    LABS:                        12.4   7.01  )-----------( 139      ( 12 Aug 2018 06:57 )             37.3     08-12    145  |  107  |  17  ----------------------------<  99  4.4   |  21  |  0.5<L>    Ca    8.4<L>      12 Aug 2018 06:57    TPro  6.0  /  Alb  3.4<L>  /  TBili  0.6  /  DBili  x   /  AST  62<H>  /  ALT  247<H>  /  AlkPhos  239<H>  08-12    RADIOLOGY:   from: MR Abdomen No Cont (08.07.18 @ 22:38) >  Cholelithiasis without evidence for choledocholithiasis or biliary   ductal dilation; low insertion of the cystic duct which courses posterior   to the common hepatic duct.    2.  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 A follow-up pancreas protocol MRI with   contrast is recommended in one year to document stability.      < from: US Abdomen Limited (08.06.18 @ 01:02) >  Gallbladder sludge.     Otherwise unremarkable rightupper quadrant ultrasound.      < end of copied text >            PHYSICAL EXAM:    GENERAL: NAD, well-developed, AAOx3  HEENT:  Atraumatic, Normocephalic. EOMI, PERRLA, conjunctiva and sclera clear, No JVD  PULMONARY: Clear to auscultation bilaterally; No wheeze  CARDIOVASCULAR: Regular rate and rhythm; No murmurs, rubs, or gallops  GASTROINTESTINAL: Soft, Nontender, Nondistended; Bowel sounds present  MUSCULOSKELETAL:  2+ Peripheral Pulses, No clubbing, cyanosis, or edema  NEUROLOGY: non-focal  SKIN: No rashes or lesions      ADMISSION SUMMARY  Patient is a 78y old Female who presents with a chief complaint of severe abdominal pain  Currently admitted to medicine with the primary diagnosis of transamintis    ASSESSMENT & PLAN    #Transaminitis  -Pain abdomen  for 1day around the umbilicus, non radiating, 8/10 sharp relieved with pain medication.  -Patient had diffuse pain abdomen 2days ago which was relieved with morphine.   -X ray erect abdomen negative for perforation but showed dilated bowel loops suggestive of SBO vs ileus. But patient has passed stool today and no nausea or vomiting present. Abdomen is soft, not distended. No guarding or rigidity present.   - liver enzymes trending down, Alkaline phosphatase elevated, positive AMA 1:160, Positive Ca 19-9 262.9  -Negative hepatitis panel, negative ASMA, AMA  -Ultrasound abd: GB sludge  -CT abd: no evidence of acute pathology  -MRCP showed cholelithiasis.  -Surgery consulted for atypical biliary colic (as per GI)  -Liver biopsy done by IR.Patient complained of pain abdomen post surgery with referred pain to right shoulder. relieved with morphine  -f/u GI and surgery consult  -cannot rule out toxin mediated hepatitis   -f/u  toxicology screen, HSV PCR   -trend LFTs    #Acute cystitis  - Positive UA and UC for E.coli   -Started on Cefpodoxime     #diet:Regular   #dvt prophylaxis enoxaparin 40mg sc q24h  obc

## 2018-08-13 LAB
ALBUMIN SERPL ELPH-MCNC: 3.6 G/DL — SIGNIFICANT CHANGE UP (ref 3.5–5.2)
ALP SERPL-CCNC: 214 U/L — HIGH (ref 30–115)
ALT FLD-CCNC: 203 U/L — HIGH (ref 0–41)
ANION GAP SERPL CALC-SCNC: 15 MMOL/L — HIGH (ref 7–14)
AST SERPL-CCNC: 59 U/L — HIGH (ref 0–41)
BILIRUB DIRECT SERPL-MCNC: 0.3 MG/DL — HIGH (ref 0–0.2)
BILIRUB INDIRECT FLD-MCNC: 0.3 MG/DL — SIGNIFICANT CHANGE UP (ref 0.2–1.2)
BILIRUB SERPL-MCNC: 0.6 MG/DL — SIGNIFICANT CHANGE UP (ref 0.2–1.2)
BUN SERPL-MCNC: 20 MG/DL — SIGNIFICANT CHANGE UP (ref 10–20)
CALCIUM SERPL-MCNC: 9.1 MG/DL — SIGNIFICANT CHANGE UP (ref 8.5–10.1)
CHLORIDE SERPL-SCNC: 105 MMOL/L — SIGNIFICANT CHANGE UP (ref 98–110)
CO2 SERPL-SCNC: 22 MMOL/L — SIGNIFICANT CHANGE UP (ref 17–32)
CREAT SERPL-MCNC: 0.6 MG/DL — LOW (ref 0.7–1.5)
GLUCOSE SERPL-MCNC: 97 MG/DL — SIGNIFICANT CHANGE UP (ref 70–99)
HCT VFR BLD CALC: 38.7 % — SIGNIFICANT CHANGE UP (ref 37–47)
HGB BLD-MCNC: 13 G/DL — SIGNIFICANT CHANGE UP (ref 12–16)
MCHC RBC-ENTMCNC: 27.8 PG — SIGNIFICANT CHANGE UP (ref 27–31)
MCHC RBC-ENTMCNC: 33.6 G/DL — SIGNIFICANT CHANGE UP (ref 32–37)
MCV RBC AUTO: 82.7 FL — SIGNIFICANT CHANGE UP (ref 81–99)
NRBC # BLD: 0 /100 WBCS — SIGNIFICANT CHANGE UP (ref 0–0)
PLATELET # BLD AUTO: 167 K/UL — SIGNIFICANT CHANGE UP (ref 130–400)
POTASSIUM SERPL-MCNC: 4.6 MMOL/L — SIGNIFICANT CHANGE UP (ref 3.5–5)
POTASSIUM SERPL-SCNC: 4.6 MMOL/L — SIGNIFICANT CHANGE UP (ref 3.5–5)
PROT SERPL-MCNC: 6.3 G/DL — SIGNIFICANT CHANGE UP (ref 6–8)
RBC # BLD: 4.68 M/UL — SIGNIFICANT CHANGE UP (ref 4.2–5.4)
RBC # FLD: 14.6 % — HIGH (ref 11.5–14.5)
SODIUM SERPL-SCNC: 142 MMOL/L — SIGNIFICANT CHANGE UP (ref 135–146)
WBC # BLD: 7.26 K/UL — SIGNIFICANT CHANGE UP (ref 4.8–10.8)
WBC # FLD AUTO: 7.26 K/UL — SIGNIFICANT CHANGE UP (ref 4.8–10.8)

## 2018-08-13 RX ORDER — CIPROFLOXACIN LACTATE 400MG/40ML
500 VIAL (ML) INTRAVENOUS EVERY 12 HOURS
Qty: 0 | Refills: 0 | Status: DISCONTINUED | OUTPATIENT
Start: 2018-08-13 | End: 2018-08-15

## 2018-08-13 RX ADMIN — Medication 5 MILLIGRAM(S): at 21:25

## 2018-08-13 RX ADMIN — Medication 25 MILLIGRAM(S): at 21:25

## 2018-08-13 RX ADMIN — ESCITALOPRAM OXALATE 5 MILLIGRAM(S): 10 TABLET, FILM COATED ORAL at 11:01

## 2018-08-13 RX ADMIN — ENOXAPARIN SODIUM 40 MILLIGRAM(S): 100 INJECTION SUBCUTANEOUS at 06:01

## 2018-08-13 RX ADMIN — Medication 500 MILLIGRAM(S): at 17:58

## 2018-08-13 RX ADMIN — Medication 50 MILLIGRAM(S): at 06:01

## 2018-08-13 RX ADMIN — Medication 200 MILLIGRAM(S): at 06:01

## 2018-08-13 NOTE — DISCHARGE NOTE ADULT - HOSPITAL COURSE
79 y/o F with PMH of HTN, CAD with stent, depression ,breast ca in remission presents with acute onset abdominal pain. Patient presented with severe abdominal pain , 8/10,sharp, non radiating, around umbilicus area since afternoon. Pain started after eating a chocolate milkshake. Denies any associated symptoms. Pain was relieved with medication. Liver enzymes were significantly elevated on admission. Ultrasound showed gallbladder sludge. Extensive workup was done to find the cause for elevated liver enzymes and everything was negative. MRCP showed Cholelithiasis and intraductal papillary mucinous neoplasms(IPMNs) of pancreas. Patient was followed by gastroenterology and Surgery during the course of admission. No surgical intervention needed at this point according to surgical team. On workup there was elevation in tumor markers but MRI with contrast showed no mass in pancreas. Patient was also treated for acute cystitis while she was admitted here. The cause for elevated liver enzymes is possibly due to a gall bladder stone that was passed into CBD. Patient to follow GI in 2 weeks and followup with surgery as outpatient for elective cholecystectomy if symptomatic.

## 2018-08-13 NOTE — DISCHARGE NOTE ADULT - MEDICATION SUMMARY - MEDICATIONS TO TAKE
I will START or STAY ON the medications listed below when I get home from the hospital:    Lexapro 20 mg oral tablet  -- 1 tab(s) by mouth once a day  -- Indication: For Depression, unspecified depression type    traZODone 100 mg oral tablet  -- 1 tab(s) by mouth once a day (at bedtime)  -- Indication: For Depression, unspecified depression type    atorvastatin 20 mg oral tablet  -- 1 tab(s) by mouth once a day (at bedtime)  -- Indication: For Coronary artery disease involving native heart without angina pectoris, unspecified vessel or lesion type    anastrozole 1 mg oral tablet  -- 1 tab(s) by mouth once a day  -- Indication: For Malignant neoplasm of female breast, unspecified estrogen receptor status, unspecified laterality, unspecified site of breast    QUEtiapine 50 mg oral tablet  -- 1 tab(s) by mouth once a day (at bedtime)  -- Indication: For Depression, unspecified depression type    metoprolol succinate 50 mg oral tablet, extended release  -- 1 tab(s) by mouth once a day  -- Indication: For Hypertension, unspecified type

## 2018-08-13 NOTE — DISCHARGE NOTE ADULT - CARE PROVIDER_API CALL
Tobin gonzales  [address] [address]  Phone: [phone]  Fax: (   )    -    Rosa Roblero), Internal Medicine  [address] [place] [address]  Phone: [phone]  Fax: [phone]

## 2018-08-13 NOTE — DISCHARGE NOTE ADULT - PROVIDER TOKENS
FREE:[LAST:[janet],FIRST:[Tobin],PHONE:[phone]],FAX:[(   [phone]    -],ADDRESS:[[address]],TOKEN:'[id number]'

## 2018-08-13 NOTE — PROGRESS NOTE ADULT - SUBJECTIVE AND OBJECTIVE BOX
PERRI GROVE  78y Female    INTERVAL HPI/OVERNIGHT EVENTS:  Patient seen and examined. No abdominal pain. No N/V.    ROS: All other systems are negative.    Vital Signs:    T(F): 97.3 (18 @ 13:26), Max: 97.6 (18 @ 19:40)  HR: 77 (18 @ 13:26) (70 - 77)  BP: 101/51 (18 @ 13:26) (101/51 - 122/58)  RR: 18 (18 @ 13:26) (18 - 18)  SpO2: 95% (18 @ 11:04) (95% - 95%)  I&O's Summary    Daily     Daily Weight in k.6 (13 Aug 2018 05:47)  CAPILLARY BLOOD GLUCOSE          PHYSICAL EXAM:    GENERAL:  NAD  SKIN: No rashes or lesions  HENT: Atrumatic. Normocephalic. PERRL. Moist membranes.  NECK: Supple, No JVD. No lymphadenopathy.  PULMONARY: CTA B/L. No wheezing. No rales  CVS: Normal S1, S2. Rate and Rythm are regular. No murmurs.  ABDOMEN/GI: Soft, Nontender, Nondistended; BS present  EXTREMITIES: Peripheral pulses intact. No edema B/L LE.  NEUROLOGIC:  No motor or sensory deficit.  PSYCH: Alert & oriented x 3    Consultant(s) Notes Reviewed:  [x ] YES  [ ] NO  Care Discussed with Consultants/Other Providers [ x] YES  [ ] NO      LABS:                        13.0   7.26  )-----------( 167      ( 13 Aug 2018 07:58 )             38.7         142  |  105  |  20  ----------------------------<  97  4.6   |  22  |  0.6<L>    Ca    9.1      13 Aug 2018 07:58    TPro  6.3  /  Alb  3.6  /  TBili  0.6  /  DBili  0.3<H>  /  AST  59<H>  /  ALT  203<H>  /  AlkPhos  214<H>                RADIOLOGY & ADDITIONAL TESTS:      Imaging or report Personally Reviewed:  [ ] YES  [ ] NO    Medications:  Standing  cefpodoxime 200 milliGRAM(s) Oral every 12 hours  enoxaparin Injectable 40 milliGRAM(s) SubCutaneous every 24 hours  escitalopram 5 milliGRAM(s) Oral daily  melatonin 5 milliGRAM(s) Oral at bedtime  metoprolol succinate ER 50 milliGRAM(s) Oral daily  traZODone 25 milliGRAM(s) Oral at bedtime    PRN Meds      Case discussed with resident    Care discussed with pt/family

## 2018-08-13 NOTE — DISCHARGE NOTE ADULT - PLAN OF CARE
Treat appropriately and avoid complications You were admitted with pain in abdomen and was found to have markedly elevated liver enzymes. We worked up to find a cause for your elevation in liver enzymes. Ultrasound showed gall bladder sludge but no stones. MRCP was done which showed stones in gallbladder but no signs of active infection. There was elevation in a tumor marker for pancreatic cancer but we saw benign pancreatic cysts but no mass in MRI with contrast. Your liver enzymes started trending down. You can follow up in 1year with MRI abdomen. Follow up with gastroenterologist in 2weeks. If you have persistent pain in your abdomen then follow up with surgery for elective cholecystectomy( removal of gallbladder). You are currently well controlled on your blood pressure medications. Please followup with your primary care provider. You had infection in your bladder during your admission. You were treated with appropriate course of antibiotics.

## 2018-08-13 NOTE — DISCHARGE NOTE ADULT - CARE PLAN
Principal Discharge DX:	Elevated liver function tests  Goal:	Treat appropriately and avoid complications  Assessment and plan of treatment:	You were admitted with pain in abdomen and was found to have markedly elevated liver enzymes. We worked up to find a cause for your elevation in liver enzymes. Ultrasound showed gall bladder sludge but no stones. MRCP was done which showed stones in gallbladder but no signs of active infection. There was elevation in a tumor marker for pancreatic cancer but we saw benign pancreatic cysts but no mass in MRI with contrast. Your liver enzymes started trending down. You can follow up in 1year with MRI abdomen. Follow up with gastroenterologist in 2weeks. If you have persistent pain in your abdomen then follow up with surgery for elective cholecystectomy( removal of gallbladder).  Secondary Diagnosis:	Hypertension, unspecified type  Goal:	Treat appropriately and avoid complications  Assessment and plan of treatment:	You are currently well controlled on your blood pressure medications. Please followup with your primary care provider.  Secondary Diagnosis:	Acute cystitis without hematuria  Goal:	Treat appropriately and avoid complications  Assessment and plan of treatment:	You had infection in your bladder during your admission. You were treated with appropriate course of antibiotics.

## 2018-08-13 NOTE — PROGRESS NOTE ADULT - SUBJECTIVE AND OBJECTIVE BOX
PERRI GROVE 78y Female  MRN#: [id number]       SUBJECTIVE  Patient is a 78y old Female who presents with a chief complaint of severe abdominal pain   Currently admitted to medicine with the primary diagnosis of transaminitis  Today is hospital day 7d, and this morning she reports no overnight events.     OBJECTIVE  PAST MEDICAL & SURGICAL HISTORY  Malignant neoplasm of female breast, unspecified estrogen receptor status, unspecified laterality, unspecified site of breast  Depression, unspecified depression type  Coronary artery disease involving native heart without angina pectoris, unspecified vessel or lesion type  Hypertension, unspecified type    ALLERGIES:  No Known Allergies    MEDICATIONS:  STANDING MEDICATIONS  ciprofloxacin     Tablet 500 milliGRAM(s) Oral every 12 hours  enoxaparin Injectable 40 milliGRAM(s) SubCutaneous every 24 hours  escitalopram 5 milliGRAM(s) Oral daily  melatonin 5 milliGRAM(s) Oral at bedtime  metoprolol succinate ER 50 milliGRAM(s) Oral daily  traZODone 25 milliGRAM(s) Oral at bedtime    PRN MEDICATIONS      VITAL SIGNS: Last 24 Hours  T(C): 36.3 (13 Aug 2018 13:26), Max: 36.4 (12 Aug 2018 19:40)  T(F): 97.3 (13 Aug 2018 13:26), Max: 97.6 (12 Aug 2018 19:40)  HR: 77 (13 Aug 2018 13:26) (70 - 77)  BP: 101/51 (13 Aug 2018 13:26) (101/51 - 122/58)  BP(mean): --  RR: 18 (13 Aug 2018 13:26) (18 - 18)  SpO2: 95% (13 Aug 2018 11:04) (95% - 95%)    LABS:                        13.0   7.26  )-----------( 167      ( 13 Aug 2018 07:58 )             38.7     08-13    142  |  105  |  20  ----------------------------<  97  4.6   |  22  |  0.6<L>    Ca    9.1      13 Aug 2018 07:58    TPro  6.3  /  Alb  3.6  /  TBili  0.6  /  DBili  0.3<H>  /  AST  59<H>  /  ALT  203<H>  /  AlkPhos  214<H>  08-13    RADIOLOGY:< from: MR Abdomen No Cont (08.07.18 @ 22:38) >   Cholelithiasis without evidence for choledocholithiasis or biliary   ductal dilation; low insertion of the cystic duct which courses posterior   to the common hepatic duct.    2.  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 A follow-up pancreas protocol MRI with   contrast is recommended in one year to document stability.    < end of copied text >    < from: US Abdomen Limited (08.06.18 @ 01:02) >  Gallbladder sludge.     < end of copied text >        PHYSICAL EXAM:    GENERAL: NAD, well-developed, AAOx3  HEENT:  Atraumatic, Normocephalic. EOMI, PERRLA, conjunctiva and sclera clear, No JVD  PULMONARY: Clear to auscultation bilaterally; No wheeze  CARDIOVASCULAR: Regular rate and rhythm; No murmurs, rubs, or gallops  GASTROINTESTINAL: Soft, Nontender, Nondistended; Bowel sounds present  MUSCULOSKELETAL:  2+ Peripheral Pulses, No clubbing, cyanosis, or edema  NEUROLOGY: non-focal  SKIN: No rashes or lesions      ADMISSION SUMMARY  Patient is a 78y old Female who presents with a chief complaint of severe abdominal pain  Currently admitted to medicine with the primary diagnosis of transaminitis    ASSESSMENT & PLAN  #Transaminitis   -Pain abdomen  for 1day around the umbilicus, non radiating, 8/10 sharp relieved with pain medication.  -Patient had diffuse pain abdomen 2days ago which was relieved with morphine.   -X ray erect abdomen negative for perforation but showed dilated bowel loops suggestive of SBO vs ileus. But patient has passed stool today and no nausea or vomiting present. Abdomen is soft, not distended. No guarding or rigidity present.   - liver enzymes trending down, Alkaline phosphatase elevated, positive AMA 1:160, Positive Ca 19-9 262.9  -MR abdomen with contrast pancreatic protocol scheduled tomorrow.  -Negative hepatitis panel, negative ASMA, AMA  -Ultrasound abd: GB sludge  -CT abd: no evidence of acute pathology  -MRCP showed cholelithiasis.  -Surgery consulted for atypical biliary colic (as per GI)  -Liver biopsy done by IR.Patient complained of pain abdomen post surgery with referred pain to right shoulder. relieved with morphine  -f/u GI and surgery consult  -cannot rule out toxin mediated hepatitis   -f/u  toxicology screen, HSV PCR   -trend LFTs    #Acute cystitis  - Positive UA and UC for E.coli   -Started on Ciprofloxacin 500mg q12h    #diet:Regular   #dvt prophylaxis enoxaparin 40mg sc q24h  obc

## 2018-08-13 NOTE — PROGRESS NOTE ADULT - ASSESSMENT
79 y/o F with PMH of HTN, CAD with stent, depression ,breast ca in remission presents with acute onset abdominal pain around umbilicus area, found to have elevated liver enzymes on admission.    1.	Abdominal pain - resolved  2.	Abnormal Liver function study  3.	CAD/HTN  4.	UTI         PLAN:    ·	S/P Liver biopsy. Follow biopsy report.  ·	AST, ALT and ALP are trending down  ·	MRCP unremarkable. D/W the GI. Will order MRI with pancrease protocol  ·	Mitochondrial and smooth muscle antibodies are negative.  ·	Hep profile is also negative.  ·	Cont Metoprolol  ·	Daily LFT'S  ·	Repeat CA 19-9   ·	D/C cefpodoxime. Start her on cipro 500 mg po q 12h.

## 2018-08-13 NOTE — DISCHARGE NOTE ADULT - ADDITIONAL INSTRUCTIONS
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

## 2018-08-13 NOTE — DISCHARGE NOTE ADULT - PATIENT PORTAL LINK FT
You can access the [facility] Portal, offered by [facility], by registering with the following website: [email or web address]

## 2018-08-14 LAB
ALBUMIN SERPL ELPH-MCNC: 3.4 G/DL — LOW (ref 3.5–5.2)
ALP SERPL-CCNC: 205 U/L — HIGH (ref 30–115)
ALT FLD-CCNC: 152 U/L — HIGH (ref 0–41)
ANION GAP SERPL CALC-SCNC: 18 MMOL/L — HIGH (ref 7–14)
AST SERPL-CCNC: 43 U/L — HIGH (ref 0–41)
BILIRUB SERPL-MCNC: 0.5 MG/DL — SIGNIFICANT CHANGE UP (ref 0.2–1.2)
BUN SERPL-MCNC: 20 MG/DL — SIGNIFICANT CHANGE UP (ref 10–20)
CALCIUM SERPL-MCNC: 8.5 MG/DL — SIGNIFICANT CHANGE UP (ref 8.5–10.1)
CHLORIDE SERPL-SCNC: 103 MMOL/L — SIGNIFICANT CHANGE UP (ref 98–110)
CO2 SERPL-SCNC: 20 MMOL/L — SIGNIFICANT CHANGE UP (ref 17–32)
CREAT SERPL-MCNC: 0.6 MG/DL — LOW (ref 0.7–1.5)
GLUCOSE SERPL-MCNC: 89 MG/DL — SIGNIFICANT CHANGE UP (ref 70–99)
HCT VFR BLD CALC: 37.2 % — SIGNIFICANT CHANGE UP (ref 37–47)
HGB BLD-MCNC: 12.2 G/DL — SIGNIFICANT CHANGE UP (ref 12–16)
MCHC RBC-ENTMCNC: 27.4 PG — SIGNIFICANT CHANGE UP (ref 27–31)
MCHC RBC-ENTMCNC: 32.8 G/DL — SIGNIFICANT CHANGE UP (ref 32–37)
MCV RBC AUTO: 83.6 FL — SIGNIFICANT CHANGE UP (ref 81–99)
NRBC # BLD: 0 /100 WBCS — SIGNIFICANT CHANGE UP (ref 0–0)
PLATELET # BLD AUTO: 164 K/UL — SIGNIFICANT CHANGE UP (ref 130–400)
POTASSIUM SERPL-MCNC: 4.6 MMOL/L — SIGNIFICANT CHANGE UP (ref 3.5–5)
POTASSIUM SERPL-SCNC: 4.6 MMOL/L — SIGNIFICANT CHANGE UP (ref 3.5–5)
PROT SERPL-MCNC: 6.2 G/DL — SIGNIFICANT CHANGE UP (ref 6–8)
RBC # BLD: 4.45 M/UL — SIGNIFICANT CHANGE UP (ref 4.2–5.4)
RBC # FLD: 14.6 % — HIGH (ref 11.5–14.5)
SODIUM SERPL-SCNC: 141 MMOL/L — SIGNIFICANT CHANGE UP (ref 135–146)
SURGICAL PATHOLOGY STUDY: SIGNIFICANT CHANGE UP
WBC # BLD: 7.28 K/UL — SIGNIFICANT CHANGE UP (ref 4.8–10.8)
WBC # FLD AUTO: 7.28 K/UL — SIGNIFICANT CHANGE UP (ref 4.8–10.8)

## 2018-08-14 RX ORDER — QUETIAPINE FUMARATE 200 MG/1
50 TABLET, FILM COATED ORAL AT BEDTIME
Qty: 0 | Refills: 0 | Status: DISCONTINUED | OUTPATIENT
Start: 2018-08-14 | End: 2018-08-15

## 2018-08-14 RX ORDER — CLOPIDOGREL BISULFATE 75 MG/1
75 TABLET, FILM COATED ORAL DAILY
Qty: 0 | Refills: 0 | Status: DISCONTINUED | OUTPATIENT
Start: 2018-08-14 | End: 2018-08-15

## 2018-08-14 RX ORDER — TRAZODONE HCL 50 MG
100 TABLET ORAL AT BEDTIME
Qty: 0 | Refills: 0 | Status: DISCONTINUED | OUTPATIENT
Start: 2018-08-14 | End: 2018-08-15

## 2018-08-14 RX ORDER — ANASTROZOLE 1 MG/1
1 TABLET ORAL DAILY
Qty: 0 | Refills: 0 | Status: DISCONTINUED | OUTPATIENT
Start: 2018-08-14 | End: 2018-08-15

## 2018-08-14 RX ORDER — ESCITALOPRAM OXALATE 10 MG/1
20 TABLET, FILM COATED ORAL DAILY
Qty: 0 | Refills: 0 | Status: DISCONTINUED | OUTPATIENT
Start: 2018-08-14 | End: 2018-08-15

## 2018-08-14 RX ORDER — ATORVASTATIN CALCIUM 80 MG/1
20 TABLET, FILM COATED ORAL AT BEDTIME
Qty: 0 | Refills: 0 | Status: DISCONTINUED | OUTPATIENT
Start: 2018-08-14 | End: 2018-08-15

## 2018-08-14 RX ADMIN — Medication 500 MILLIGRAM(S): at 05:43

## 2018-08-14 RX ADMIN — Medication 5 MILLIGRAM(S): at 23:23

## 2018-08-14 RX ADMIN — ENOXAPARIN SODIUM 40 MILLIGRAM(S): 100 INJECTION SUBCUTANEOUS at 06:29

## 2018-08-14 RX ADMIN — QUETIAPINE FUMARATE 50 MILLIGRAM(S): 200 TABLET, FILM COATED ORAL at 23:23

## 2018-08-14 RX ADMIN — Medication 100 MILLIGRAM(S): at 23:22

## 2018-08-14 RX ADMIN — ATORVASTATIN CALCIUM 20 MILLIGRAM(S): 80 TABLET, FILM COATED ORAL at 21:36

## 2018-08-14 RX ADMIN — ESCITALOPRAM OXALATE 5 MILLIGRAM(S): 10 TABLET, FILM COATED ORAL at 11:31

## 2018-08-14 RX ADMIN — Medication 50 MILLIGRAM(S): at 05:43

## 2018-08-14 RX ADMIN — Medication 500 MILLIGRAM(S): at 17:17

## 2018-08-14 NOTE — PROGRESS NOTE ADULT - ASSESSMENT
77 y/o F with PMH of HTN, CAD with stent, depression ,breast ca in remission presents with acute onset abdominal pain around umbilicus area, found to have elevated liver enzymes on admission.    1.	Abdominal pain - resolved  2.	Abnormal Liver function study  3.	CAD/HTN  4.	UTI         PLAN:    ·	S/P Liver biopsy. Follow biopsy report.  ·	AST, ALT and ALP are trending down  ·	MRCP unremarkable. D/W the GI.   ·	Waiting for MRI with pancrease protocol  ·	Mitochondrial and smooth muscle antibodies are negative.  ·	Hep profile is also negative.  ·	Cont Metoprolol  ·	Daily LFT'S  ·	Check repeat CA 19-9   ·	Cont cipro 500 mg po q 12h.

## 2018-08-14 NOTE — PROGRESS NOTE ADULT - ASSESSMENT
77 yo F w/ h/o HTN, CAD s/p stent(11 yrs ago), depression, breast ca s/p lumpectomy 10 yrs ago-in remission; presented from home on 08/05 w/ c/o sudden onset abdominal pain in periumbilical region    1-Acute recurrent abdominal pain with increase liver enzymes rule out  parenchymal etiologies  Liver enzymes trending down  MRCP showed cholelithiasis but no CBD stone or dilatation  IR guided  liver biopsy done last week  results still pending   CLD workup negative  except elevated BARBARA which non specific   follow liver biopsy results   UTI on ciprofloxacin  supportive care       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  with no PD dilation and worrisome feature on MRCP  Increase Ca19-9, normal CEA  Recommend MRI with pancreatic protocol     Needs screening colonoscopy that can be done as outpatient 79 yo F w/ h/o HTN, CAD s/p stent(11 yrs ago), depression, breast ca s/p lumpectomy 10 yrs ago-in remission; presented from home on 08/05 w/ c/o sudden onset abdominal pain in periumbilical region    1-Acute recurrent abdominal pain with increase liver enzymes most likely secondary to a passed CBD stone   Liver enzymes trending down and pain resolved  US abdomen showed biliary sludge and no signs of cholecystitis   MRCP showed cholelithiasis but no CBD stone or dilatation  IR guided  liver biopsy done last week  results still pending   CLD workup negative  except elevated BARBARA which non specific but can be associated with autoimmune hepatitis  follow liver biopsy results   Needs surgery follow up for Lap Cholecystectomy as outpatient or during this admission        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  with no PD dilation and worrisome feature on MRCP  Increase Ca19-9, normal CEA  Recommend MRI with pancreatic protocol, if findings are the same need repeat one in 1 year     3-UTI      Continue ciprofloxacin     supportive care       4- Needs screening colonoscopy (can be done as outpatient)     Last colonoscopy   more then 5 y ago and nothing on records 79 yo F w/ h/o HTN, CAD s/p stent(11 yrs ago), depression, breast ca s/p lumpectomy 10 yrs ago-in remission; presented from home on 08/05 w/ c/o sudden onset abdominal pain in periumbilical region    1-Acute recurrent abdominal pain with increase liver enzymes most likely secondary to a passed CBD stone   Liver enzymes trending down and pain resolved  US abdomen showed biliary sludge and no signs of cholecystitis   MRCP showed cholelithiasis but no CBD stone or dilatation  IR guided  liver biopsy done last week  results still pending   CLD workup negative  except elevated BARBARA which non specific but can be associated with autoimmune hepatitis  follow liver biopsy results   Needs surgery follow up for Lap Cholecystectomy as outpatient or during this admission        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  with no PD dilation and worrisome feature on MRCP  Increase Ca19-9, normal CEA  Recommend MRI with pancreatic protocol, if findings are the same need repeat one in 1 year     3-UTI      Continue ciprofloxacin     supportive care       4- Needs screening colonoscopy (can be done as outpatient)     Last colonoscopy   more then 5 y ago and nothing on records       will f/up

## 2018-08-14 NOTE — DIETITIAN INITIAL EVALUATION ADULT. - OTHER INFO
Initial assessment for LOS p/w: abd pain. Transaminitis: -X ray erect abdomen negative for perforation but showed dilated bowel loops suggestive of SBO vs ileus. But patient has passed stool today and no nausea or vomiting present. Abdomen is soft, not distended. No guarding or rigidity present. MR abdomen with contrast pancreatic protocol pending. -Surgery consulted for atypical biliary colic (as per GI). Acute cystitis: abx.

## 2018-08-14 NOTE — PROGRESS NOTE ADULT - SUBJECTIVE AND OBJECTIVE BOX
PERRI GROVE  78y Female    INTERVAL HPI/OVERNIGHT EVENTS:  Patient seen and examined. No more abdominal pain. No N/V. Waiting for MRI pancrease    ROS: All other systems are negative.    Vital Signs:    T(F): 98 (08-14-18 @ 05:52), Max: 98.5 (08-13-18 @ 20:22)  HR: 63 (08-14-18 @ 05:52) (63 - 77)  BP: 113/55 (08-14-18 @ 05:52) (101/51 - 119/60)  RR: 18 (08-14-18 @ 05:52) (18 - 18)  SpO2: --  I&O's Summary    Daily Height in cm: 170.1 (14 Aug 2018 09:27)    Daily   CAPILLARY BLOOD GLUCOSE          PHYSICAL EXAM:    GENERAL:  NAD  SKIN: No rashes or lesions  HENT: Atrumatic. Normocephalic. PERRL. Moist membranes.  NECK: Supple, No JVD. No lymphadenopathy.  PULMONARY: CTA B/L. No wheezing. No rales  CVS: Normal S1, S2. Rate and Rythm are regular. No murmurs.  ABDOMEN/GI: Soft, Nontender, Nondistended; BS present  EXTREMITIES: Peripheral pulses intact. No edema B/L LE.  NEUROLOGIC:  No motor or sensory deficit.  PSYCH: Alert & oriented x 3    Consultant(s) Notes Reviewed:  [x ] YES  [ ] NO  Care Discussed with Consultants/Other Providers [ x] YES      LABS:                        12.2   7.28  )-----------( 164      ( 14 Aug 2018 07:09 )             37.2     08-14    141  |  103  |  20  ----------------------------<  89  4.6   |  20  |  0.6<L>    Ca    8.5      14 Aug 2018 07:09    TPro  6.2  /  Alb  3.4<L>  /  TBili  0.5  /  DBili  x   /  AST  43<H>  /  ALT  152<H>  /  AlkPhos  205<H>  08-14              RADIOLOGY & ADDITIONAL TESTS:      Imaging or report Personally Reviewed:  [ ] YES  [ ] NO    Medications:  Standing  ciprofloxacin     Tablet 500 milliGRAM(s) Oral every 12 hours  enoxaparin Injectable 40 milliGRAM(s) SubCutaneous every 24 hours  escitalopram 5 milliGRAM(s) Oral daily  melatonin 5 milliGRAM(s) Oral at bedtime  metoprolol succinate ER 50 milliGRAM(s) Oral daily  traZODone 25 milliGRAM(s) Oral at bedtime    PRN Meds      Case discussed with resident    Care discussed with pt/family

## 2018-08-14 NOTE — DIETITIAN INITIAL EVALUATION ADULT. - ORAL INTAKE PTA
reports good appetite, regular meals and snacks, likes OJ and muffins for breakfast, sandwich for lunch and meat with sides for dinner, takes calcium, vit C and D and flax oil supplement/good

## 2018-08-14 NOTE — PROGRESS NOTE ADULT - SUBJECTIVE AND OBJECTIVE BOX
PERRI GROVE 78y Female  MRN#: [id number]       SUBJECTIVE  Patient is a 78y old Female who presents with a chief complaint of severe abdominal pain   Currently admitted to medicine with the primary diagnosis of elevated liver enzymes  Today is hospital day 8d, and this morning she reports no overnight events.     OBJECTIVE  PAST MEDICAL & SURGICAL HISTORY  Malignant neoplasm of female breast, unspecified estrogen receptor status, unspecified laterality, unspecified site of breast  Depression, unspecified depression type  Coronary artery disease involving native heart without angina pectoris, unspecified vessel or lesion type  Hypertension, unspecified type    ALLERGIES:  No Known Allergies    MEDICATIONS:  STANDING MEDICATIONS  atorvastatin 20 milliGRAM(s) Oral at bedtime  ciprofloxacin     Tablet 500 milliGRAM(s) Oral every 12 hours  clopidogrel Tablet 75 milliGRAM(s) Oral daily  enoxaparin Injectable 40 milliGRAM(s) SubCutaneous every 24 hours  escitalopram 20 milliGRAM(s) Oral daily  melatonin 5 milliGRAM(s) Oral at bedtime  metoprolol succinate ER 50 milliGRAM(s) Oral daily  traZODone 100 milliGRAM(s) Oral at bedtime    PRN MEDICATIONS      VITAL SIGNS: Last 24 Hours  T(C): 36.3 (14 Aug 2018 13:30), Max: 36.9 (13 Aug 2018 20:22)  T(F): 97.3 (14 Aug 2018 13:30), Max: 98.5 (13 Aug 2018 20:22)  HR: 66 (14 Aug 2018 13:30) (63 - 67)  BP: 139/63 (14 Aug 2018 13:30) (113/55 - 139/63)  BP(mean): --  RR: 18 (14 Aug 2018 13:30) (18 - 18)  SpO2: --    LABS:                        12.2   7.28  )-----------( 164      ( 14 Aug 2018 07:09 )             37.2     08-14    141  |  103  |  20  ----------------------------<  89  4.6   |  20  |  0.6<L>    Ca    8.5      14 Aug 2018 07:09    TPro  6.2  /  Alb  3.4<L>  /  TBili  0.5  /  DBili  x   /  AST  43<H>  /  ALT  152<H>  /  AlkPhos  205<H>  08-14    RADIOLOGY:< from: US Abdomen Limited (08.06.18 @ 01:02) >  Gallbladder sludge.     Otherwise unremarkable rightupper quadrant ultrasound.    < end of copied text >      < from: MR Abdomen No Cont (08.07.18 @ 22:38) >   Cholelithiasis without evidence for choledocholithiasis or biliary   ductal dilation; low insertion of the cystic duct which courses posterior   to the common hepatic duct.    2.  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 A follow-up pancreas protocol MRI with   contrast is recommended in one year to document stability.    < end of copied text >      PHYSICAL EXAM:    GENERAL: NAD, well-developed, AAOx3  HEENT:  Atraumatic, Normocephalic. EOMI, PERRLA, conjunctiva and sclera clear, No JVD  PULMONARY: Clear to auscultation bilaterally; No wheeze  CARDIOVASCULAR: Regular rate and rhythm; No murmurs, rubs, or gallops  GASTROINTESTINAL: Soft, Nontender, Nondistended; Bowel sounds present  MUSCULOSKELETAL:  2+ Peripheral Pulses, No clubbing, cyanosis, or edema  NEUROLOGY: non-focal  SKIN: No rashes or lesions      ADMISSION SUMMARY  Patient is a 78y old Female who presents with a chief complaint of severe abdominal pain   Currently admitted to medicine with the primary diagnosis of elevated liver function tests.    ASSESSMENT & PLAN    Discussed with the patient and her daughter in detail why we are performing MRI Abdomen c/cout contrast with pancreatic protocol. Daughter was concerned about the history of depression in the patient and wanted to know the MRI results before we tell the patient tomorrow morning.    #Transaminitis   -Pain abdomen  for 1day around the umbilicus, non radiating, 8/10 sharp relieved with pain medication.  -Patient had diffuse pain abdomen 2days ago which was relieved with morphine.   -X ray erect abdomen negative for perforation but showed dilated bowel loops suggestive of SBO vs ileus. But patient has passed stool today and no nausea or vomiting present. Abdomen is soft, not distended. No guarding or rigidity present.   - liver enzymes trending down, Alkaline phosphatase elevated, positive AMA 1:160, Positive Ca 19-9 262.9  -MR abdomen with contrast pancreatic protocol scheduled today.  -Negative hepatitis panel, negative ASMA, AMA  -Ultrasound abd: GB sludge  -CT abd: no evidence of acute pathology  -MRCP showed cholelithiasis.  -Surgery consulted for atypical biliary colic (as per GI)  -Liver biopsy done by IR.Patient complained of pain abdomen post surgery with referred pain to right shoulder. relieved with morphine  -f/u GI and surgery consult  -cannot rule out toxin mediated hepatitis   -f/u  toxicology screen, HSV PCR   -trend LFTs    #Acute cystitis  - Positive UA and UC for E.coli   -Started on Ciprofloxacin 500mg q12h    #diet:Regular   #dvt prophylaxis enoxaparin 40mg sc q24h  obc PERRI GROVE 78y Female  MRN#: [id number]       SUBJECTIVE  Patient is a 78y old Female who presents with a chief complaint of severe abdominal pain   Currently admitted to medicine with the primary diagnosis of elevated liver enzymes  Today is hospital day 8d, and this morning she reports no overnight events.     OBJECTIVE  PAST MEDICAL & SURGICAL HISTORY  Malignant neoplasm of female breast, unspecified estrogen receptor status, unspecified laterality, unspecified site of breast  Depression, unspecified depression type  Coronary artery disease involving native heart without angina pectoris, unspecified vessel or lesion type  Hypertension, unspecified type    ALLERGIES:  No Known Allergies    MEDICATIONS:  STANDING MEDICATIONS  atorvastatin 20 milliGRAM(s) Oral at bedtime  ciprofloxacin     Tablet 500 milliGRAM(s) Oral every 12 hours  clopidogrel Tablet 75 milliGRAM(s) Oral daily  enoxaparin Injectable 40 milliGRAM(s) SubCutaneous every 24 hours  escitalopram 20 milliGRAM(s) Oral daily  melatonin 5 milliGRAM(s) Oral at bedtime  metoprolol succinate ER 50 milliGRAM(s) Oral daily  traZODone 100 milliGRAM(s) Oral at bedtime    PRN MEDICATIONS      VITAL SIGNS: Last 24 Hours  T(C): 36.3 (14 Aug 2018 13:30), Max: 36.9 (13 Aug 2018 20:22)  T(F): 97.3 (14 Aug 2018 13:30), Max: 98.5 (13 Aug 2018 20:22)  HR: 66 (14 Aug 2018 13:30) (63 - 67)  BP: 139/63 (14 Aug 2018 13:30) (113/55 - 139/63)  BP(mean): --  RR: 18 (14 Aug 2018 13:30) (18 - 18)  SpO2: --    LABS:                        12.2   7.28  )-----------( 164      ( 14 Aug 2018 07:09 )             37.2     08-14    141  |  103  |  20  ----------------------------<  89  4.6   |  20  |  0.6<L>    Ca    8.5      14 Aug 2018 07:09    TPro  6.2  /  Alb  3.4<L>  /  TBili  0.5  /  DBili  x   /  AST  43<H>  /  ALT  152<H>  /  AlkPhos  205<H>  08-14    RADIOLOGY:< from: US Abdomen Limited (08.06.18 @ 01:02) >  Gallbladder sludge.     Otherwise unremarkable rightupper quadrant ultrasound.    < end of copied text >      < from: MR Abdomen No Cont (08.07.18 @ 22:38) >   Cholelithiasis without evidence for choledocholithiasis or biliary   ductal dilation; low insertion of the cystic duct which courses posterior   to the common hepatic duct.    2.  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 A follow-up pancreas protocol MRI with   contrast is recommended in one year to document stability.    < end of copied text >      PHYSICAL EXAM:    GENERAL: NAD, well-developed, AAOx3  HEENT:  Atraumatic, Normocephalic. EOMI, PERRLA, conjunctiva and sclera clear, No JVD  PULMONARY: Clear to auscultation bilaterally; No wheeze  CARDIOVASCULAR: Regular rate and rhythm; No murmurs, rubs, or gallops  GASTROINTESTINAL: Soft, Nontender, Nondistended; Bowel sounds present  MUSCULOSKELETAL:  2+ Peripheral Pulses, No clubbing, cyanosis, or edema  NEUROLOGY: non-focal  SKIN: No rashes or lesions      ADMISSION SUMMARY  Patient is a 78y old Female who presents with a chief complaint of severe abdominal pain   Currently admitted to medicine with the primary diagnosis of elevated liver function tests.    ASSESSMENT & PLAN    Discussed with the patient and her daughter in detail why we are performing MRI Abdomen c/cout contrast with pancreatic protocol. Daughter was concerned about the history of depression in the patient and wanted to know the MRI results before we tell the patient tomorrow morning.     #Transaminitis   -Pain abdomen  for 1day around the umbilicus, non radiating, 8/10 sharp relieved with pain medication.  -Patient had diffuse pain abdomen 2days ago which was relieved with morphine.   -X ray erect abdomen negative for perforation but showed dilated bowel loops suggestive of SBO vs ileus. But patient has passed stool today and no nausea or vomiting present. Abdomen is soft, not distended. No guarding or rigidity present.   - liver enzymes trending down, Alkaline phosphatase elevated, positive AMA 1:160, Positive Ca 19-9 262.9  -MR abdomen with contrast pancreatic protocol scheduled today.  -Negative hepatitis panel, negative ASMA, AMA  -Ultrasound abd: GB sludge  -CT abd: no evidence of acute pathology  -MRCP showed cholelithiasis.  -Surgery consulted for atypical biliary colic (as per GI)  -Liver biopsy done by IR.Patient complained of pain abdomen post surgery with referred pain to right shoulder. relieved with morphine  -f/u GI and surgery consult  -cannot rule out toxin mediated hepatitis   -f/u  toxicology screen, HSV PCR   -trend LFTs    #Acute cystitis  - Positive UA and UC for E.coli   -Started on Ciprofloxacin 500mg q12h    #diet:Regular   #dvt prophylaxis enoxaparin 40mg sc q24h  obc    Phone daughter #[phone]

## 2018-08-14 NOTE — PROGRESS NOTE ADULT - SUBJECTIVE AND OBJECTIVE BOX
GI HPI Today:  Patient feels good and denies any complaints. Denies any abdominal pain, vomiting, diarrhea, hematochezia or melena    Hospital course:  79 y/o F with PMH of HTN, CAD with stent, depression ,breast ca in remission presents with acute onset abdominal pain.  Pt reports severe abdominal pain , 8/10,sharp, non radiating, around umbilicus area since afternoon. Pain started after eating a chocolate milkshake. Denies any associated symptoms. Denies nausea/vomiting/fever/chills/diarrhea/urinary symptoms/flank pain .  Denies CP/sob/cough/palpitations/heaache/travel hx /sick contact. ROS otherwise negative. (06 Aug 2018 02:40)      PAST MEDICAL & SURGICAL HISTORY  Malignant neoplasm of female breast, unspecified estrogen receptor status, unspecified laterality, unspecified site of breast  Depression, unspecified depression type  Coronary artery disease involving native heart without angina pectoris, unspecified vessel or lesion type  Hypertension, unspecified type      ALLERGIES:  No Known Allergies      MEDICATIONS:  MEDICATIONS  (STANDING):  ciprofloxacin     Tablet 500 milliGRAM(s) Oral every 12 hours  enoxaparin Injectable 40 milliGRAM(s) SubCutaneous every 24 hours  escitalopram 5 milliGRAM(s) Oral daily  melatonin 5 milliGRAM(s) Oral at bedtime  metoprolol succinate ER 50 milliGRAM(s) Oral daily  traZODone 25 milliGRAM(s) Oral at bedtime    MEDICATIONS  (PRN):      REVIEW OF SYSTEMS  General:  No weight loss, fevers, or chills.  Eyes:  No reported pain or visual changes  ENT:  No sore throat or runny nose.  NECK: No stiffness or lymphadenopathy  CV:  No chest pain or palpitations.  Resp:  No shortness of breath, cough, wheezing or hemoptysis  GI:  No abdominal pain, nausea, vomiting, dysphagia, diarrhea or constipation. No rectal bleeding, melena, or hematemesis.  :  No dysuria, urinary incontinence or hematuria.  Muscle:  No aches or weakness  Neuro:  No tingling, numbness or vertigo  Psych:  No mood problems or irritability.  Endocrine:  No polyuria, polydipsia or cold/heat intolerance  Heme:  No ecchymosis or easy bruisability  All other review of systems is negative unless indicated above.       VITALS:   T(F): 98 (08-14 @ 05:52), Max: 99.2 (08-11 @ 20:34)  HR: 63 (08-14 @ 05:52) (63 - 85)  BP: 113/55 (08-14 @ 05:52) (101/51 - 137/63)  BP(mean): --  RR: 18 (08-14 @ 05:52) (18 - 18)  SpO2: 95% (08-13 @ 11:04) (94% - 96%)        PHYSICAL EXAM:  GENERAL:  Appears stated age, well-groomed, well-nourished, no distress  HEENT:  Conjunctivae clear and pink, no thyromegaly, nodules, adenopathy, no JVD, sclera -anicteric  CHEST:  Full & symmetric excursion, no increased effort, breath sounds clear  HEART:  Regular rhythm, S1, S2,   ABDOMEN:  Soft, non-tender, non-distended, normoactive bowel sounds,  no masses ,no hepato-splenomegaly, no signs of chronic liver disease  EXTEREMITIES:  no edema  SKIN:  No rash  NEURO:  Alert, oriented, no asterixis, no tremor, no encephalopathy         Blood Work :                        13.0   7.26  )-----------( 167      ( 13 Aug 2018 07:58 )             38.7       08-13    142  |  105  |  20  ----------------------------<  97  4.6   |  22  |  0.6<L>    Ca    9.1      13 Aug 2018 07:58      CBC -  ( 13 Aug 2018 07:58 )  Hemoglobin : 13.0    CBC -  ( 12 Aug 2018 06:57 )  Hemoglobin : 12.4    CBC -  ( 11 Aug 2018 06:05 )  Hemoglobin : 12.7    CBC -  ( 10 Aug 2018 08:18 )  Hemoglobin : 13.9      LIVER FUNCTIONS - ( 13 Aug 2018 07:58 )  Alb: 3.6 [3.5 - 5.2] / Pro: 6.3 [6.0 - 8.0] / ALK PHOS: 214 [30 - 115] / ALT: 203 [0 - 41] / AST: 59 [0 - 41] / GGT: x     LIVER FUNCTIONS - ( 12 Aug 2018 06:57 )  Alb: 3.4 [3.5 - 5.2] / Pro: 6.0 [6.0 - 8.0] / ALK PHOS: 239 [30 - 115] / ALT: 247 [0 - 41] / AST: 62 [0 - 41] / GGT: x     LIVER FUNCTIONS - ( 11 Aug 2018 06:05 )  Alb: 3.3 [3.5 - 5.2] / Pro: 5.8 [6.0 - 8.0] / ALK PHOS: 250 [30 - 115] / ALT: 325 [0 - 41] / AST: 84 [0 - 41] / GGT: x     LIVER FUNCTIONS - ( 10 Aug 2018 08:18 )  Alb: 3.5 [3.5 - 5.2] / Pro: 6.2 [6.0 - 8.0] / ALK PHOS: 279 [30 - 115] / ALT: 465 [0 - 41] / AST: 141 [0 - 41] / GGT: x     LIVER FUNCTIONS - ( 09 Aug 2018 08:33 )  Alb: 3.9 [3.5 - 5.2] / Pro: 6.4 [6.0 - 8.0] / ALK PHOS: 271 [30 - 115] / ALT: 603 [0 - 41] / AST: 274 [0 - 41] / GGT: x     LIVER FUNCTIONS - ( 08 Aug 2018 07:45 )  Alb: 3.7 [3.5 - 5.2] / Pro: 6.3 [6.0 - 8.0] / ALK PHOS: 233 [30 - 115] / ALT: 723 [0 - 41] / AST: 372 [0 - 41] / GGT: x     LIVER FUNCTIONS - ( 07 Aug 2018 08:53 )  Alb: 4.0 [3.5 - 5.2] / Pro: 6.6 [6.0 - 8.0] / ALK PHOS: 252 [30 - 115] / ALT: >700 [0 - 41] / AST: 567 [0 - 41] / GGT: x         RADIOLOGY:    MRCP:  IMPRESSION:  1.  Cholelithiasis without evidence for choledocholithiasis or biliary   ductal dilation; low insertion of the cystic duct which courses posterior   to the common hepatic duct.    2.  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 A follow-up pancreas protocol MRI with   contrast is recommended in one year to document stability.        < from: CT Abdomen and Pelvis w/ IV Cont (08.06.18 @ 00:03) >  IMPRESSION:    No CT evidence for acute abdominopelvic pathology.    6 mm left lower lobe pulmonary nodule. As per Fleischner Society   guidelines, follow-up chest CT in 6-12 months is recommended           < end of copied text > We were asked to see the patient for Abdominal pain and elevated liver enzymes     GI HPI Today:  Patient feels good and denies any complaints. Denies any abdominal pain, vomiting, diarrhea, hematochezia or melena    PAST MEDICAL & SURGICAL HISTORY  Malignant neoplasm of female breast, unspecified estrogen receptor status, unspecified laterality, unspecified site of breast  Depression, unspecified depression type  Coronary artery disease involving native heart without angina pectoris, unspecified vessel or lesion type  Hypertension, unspecified type      ALLERGIES:  No Known Allergies      MEDICATIONS:  MEDICATIONS  (STANDING):  ciprofloxacin     Tablet 500 milliGRAM(s) Oral every 12 hours  enoxaparin Injectable 40 milliGRAM(s) SubCutaneous every 24 hours  escitalopram 5 milliGRAM(s) Oral daily  melatonin 5 milliGRAM(s) Oral at bedtime  metoprolol succinate ER 50 milliGRAM(s) Oral daily  traZODone 25 milliGRAM(s) Oral at bedtime    MEDICATIONS  (PRN):      REVIEW OF SYSTEMS  General:  No weight loss, fevers, or chills.  Eyes:  No reported pain or visual changes  ENT:  No sore throat or runny nose.  NECK: No stiffness or lymphadenopathy  CV:  No chest pain or palpitations.  Resp:  No shortness of breath, cough, wheezing or hemoptysis  GI:  see HPI  :  No dysuria, urinary incontinence or hematuria.  Muscle:  No aches or weakness  Neuro:  No tingling, numbness or vertigo  Psych:  No mood problems or irritability.  Endocrine:  No polyuria, polydipsia or cold/heat intolerance  Heme:  No ecchymosis or easy bruisability  All other review of systems is negative unless indicated above.       VITALS:   T(F): 98 (08-14 @ 05:52), Max: 99.2 (08-11 @ 20:34)  HR: 63 (08-14 @ 05:52) (63 - 85)  BP: 113/55 (08-14 @ 05:52) (101/51 - 137/63)  BP(mean): --  RR: 18 (08-14 @ 05:52) (18 - 18)  SpO2: 95% (08-13 @ 11:04) (94% - 96%)        PHYSICAL EXAM:  GENERAL:  Appears stated age, well-groomed, well-nourished, no distress  HEENT:  Conjunctivae clear and pink  CHEST:  Full & symmetric excursion, no increased effort, breath sounds clear  HEART:  Regular rhythm, S1, S2,   ABDOMEN:  Soft, non-tender, non-distended, normoactive bowel sounds  EXTEREMITIES:  no edema  SKIN:  No rash  NEURO:  Alert, oriented, no asterixis, no tremor, no encephalopathy         Blood Work :                        13.0   7.26  )-----------( 167      ( 13 Aug 2018 07:58 )             38.7       08-13    142  |  105  |  20  ----------------------------<  97  4.6   |  22  |  0.6<L>    Ca    9.1      13 Aug 2018 07:58      CBC -  ( 13 Aug 2018 07:58 )  Hemoglobin : 13.0    CBC -  ( 12 Aug 2018 06:57 )  Hemoglobin : 12.4    CBC -  ( 11 Aug 2018 06:05 )  Hemoglobin : 12.7    CBC -  ( 10 Aug 2018 08:18 )  Hemoglobin : 13.9      LIVER FUNCTIONS - ( 13 Aug 2018 07:58 )  Alb: 3.6 [3.5 - 5.2] / Pro: 6.3 [6.0 - 8.0] / ALK PHOS: 214 [30 - 115] / ALT: 203 [0 - 41] / AST: 59 [0 - 41] / GGT: x     LIVER FUNCTIONS - ( 12 Aug 2018 06:57 )  Alb: 3.4 [3.5 - 5.2] / Pro: 6.0 [6.0 - 8.0] / ALK PHOS: 239 [30 - 115] / ALT: 247 [0 - 41] / AST: 62 [0 - 41] / GGT: x     LIVER FUNCTIONS - ( 11 Aug 2018 06:05 )  Alb: 3.3 [3.5 - 5.2] / Pro: 5.8 [6.0 - 8.0] / ALK PHOS: 250 [30 - 115] / ALT: 325 [0 - 41] / AST: 84 [0 - 41] / GGT: x     LIVER FUNCTIONS - ( 10 Aug 2018 08:18 )  Alb: 3.5 [3.5 - 5.2] / Pro: 6.2 [6.0 - 8.0] / ALK PHOS: 279 [30 - 115] / ALT: 465 [0 - 41] / AST: 141 [0 - 41] / GGT: x     LIVER FUNCTIONS - ( 09 Aug 2018 08:33 )  Alb: 3.9 [3.5 - 5.2] / Pro: 6.4 [6.0 - 8.0] / ALK PHOS: 271 [30 - 115] / ALT: 603 [0 - 41] / AST: 274 [0 - 41] / GGT: x     LIVER FUNCTIONS - ( 08 Aug 2018 07:45 )  Alb: 3.7 [3.5 - 5.2] / Pro: 6.3 [6.0 - 8.0] / ALK PHOS: 233 [30 - 115] / ALT: 723 [0 - 41] / AST: 372 [0 - 41] / GGT: x     LIVER FUNCTIONS - ( 07 Aug 2018 08:53 )  Alb: 4.0 [3.5 - 5.2] / Pro: 6.6 [6.0 - 8.0] / ALK PHOS: 252 [30 - 115] / ALT: >700 [0 - 41] / AST: 567 [0 - 41] / GGT: x         RADIOLOGY:    MRCP:  IMPRESSION:  1.  Cholelithiasis without evidence for choledocholithiasis or biliary   ductal dilation; low insertion of the cystic duct which courses posterior   to the common hepatic duct.    2.  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 A follow-up pancreas protocol MRI with   contrast is recommended in one year to document stability.        < from: CT Abdomen and Pelvis w/ IV Cont (08.06.18 @ 00:03) >  IMPRESSION:    No CT evidence for acute abdominopelvic pathology.    6 mm left lower lobe pulmonary nodule. As per Fleischner Society   guidelines, follow-up chest CT in 6-12 months is recommended           < end of copied text > We were asked to see the patient for Abdominal pain and elevated liver enzymes     GI HPI Today:  Patient feels good and denies any complaints. Denies any abdominal pain, vomiting, diarrhea, hematochezia or melena    PAST MEDICAL & SURGICAL HISTORY  Malignant neoplasm of female breast, unspecified estrogen receptor status, unspecified laterality, unspecified site of breast  Depression, unspecified depression type  Coronary artery disease involving native heart without angina pectoris, unspecified vessel or lesion type  Hypertension, unspecified type      ALLERGIES:  No Known Allergies      MEDICATIONS:  MEDICATIONS  (STANDING):  ciprofloxacin     Tablet 500 milliGRAM(s) Oral every 12 hours  enoxaparin Injectable 40 milliGRAM(s) SubCutaneous every 24 hours  escitalopram 5 milliGRAM(s) Oral daily  melatonin 5 milliGRAM(s) Oral at bedtime  metoprolol succinate ER 50 milliGRAM(s) Oral daily  traZODone 25 milliGRAM(s) Oral at bedtime    REVIEW OF SYSTEMS  General:  No weight loss, fevers, or chills.  Eyes:  No reported pain or visual changes  ENT:  No sore throat or runny nose.  NECK: No stiffness or lymphadenopathy  CV:  No chest pain or palpitations.  Resp:  No shortness of breath, cough, wheezing or hemoptysis  GI:  see HPI  :  No dysuria, urinary incontinence or hematuria.  Muscle:  No aches or weakness  Neuro:  No tingling, numbness or vertigo  Psych:  No mood problems or irritability.  Endocrine:  No polyuria, polydipsia or cold/heat intolerance  Heme:  No ecchymosis or easy bruisability  All other review of systems is negative unless indicated above.       VITALS:   T(F): 98 (08-14 @ 05:52), Max: 99.2 (08-11 @ 20:34)  HR: 63 (08-14 @ 05:52) (63 - 85)  BP: 113/55 (08-14 @ 05:52) (101/51 - 137/63)  BP(mean): --  RR: 18 (08-14 @ 05:52) (18 - 18)  SpO2: 95% (08-13 @ 11:04) (94% - 96%)        PHYSICAL EXAM:  GENERAL:  Appears stated age, well-groomed, well-nourished, no distress  HEENT:  Conjunctivae clear and pink  CHEST:  Full & symmetric excursion, no increased effort, breath sounds clear  HEART:  Regular rhythm, S1, S2,   ABDOMEN:  Soft, non-tender, non-distended, normoactive bowel sounds  EXTEREMITIES:  no edema  SKIN:  No rash  NEURO:  Alert, oriented, no asterixis, no tremor, no encephalopathy         Blood Work :                        13.0   7.26  )-----------( 167      ( 13 Aug 2018 07:58 )             38.7       08-13    142  |  105  |  20  ----------------------------<  97  4.6   |  22  |  0.6<L>    Ca    9.1      13 Aug 2018 07:58      CBC -  ( 13 Aug 2018 07:58 )  Hemoglobin : 13.0    CBC -  ( 12 Aug 2018 06:57 )  Hemoglobin : 12.4    CBC -  ( 11 Aug 2018 06:05 )  Hemoglobin : 12.7    CBC -  ( 10 Aug 2018 08:18 )  Hemoglobin : 13.9      LIVER FUNCTIONS - ( 13 Aug 2018 07:58 )  Alb: 3.6 [3.5 - 5.2] / Pro: 6.3 [6.0 - 8.0] / ALK PHOS: 214 [30 - 115] / ALT: 203 [0 - 41] / AST: 59 [0 - 41] / GGT: x     LIVER FUNCTIONS - ( 12 Aug 2018 06:57 )  Alb: 3.4 [3.5 - 5.2] / Pro: 6.0 [6.0 - 8.0] / ALK PHOS: 239 [30 - 115] / ALT: 247 [0 - 41] / AST: 62 [0 - 41] / GGT: x     LIVER FUNCTIONS - ( 11 Aug 2018 06:05 )  Alb: 3.3 [3.5 - 5.2] / Pro: 5.8 [6.0 - 8.0] / ALK PHOS: 250 [30 - 115] / ALT: 325 [0 - 41] / AST: 84 [0 - 41] / GGT: x     LIVER FUNCTIONS - ( 10 Aug 2018 08:18 )  Alb: 3.5 [3.5 - 5.2] / Pro: 6.2 [6.0 - 8.0] / ALK PHOS: 279 [30 - 115] / ALT: 465 [0 - 41] / AST: 141 [0 - 41] / GGT: x     LIVER FUNCTIONS - ( 09 Aug 2018 08:33 )  Alb: 3.9 [3.5 - 5.2] / Pro: 6.4 [6.0 - 8.0] / ALK PHOS: 271 [30 - 115] / ALT: 603 [0 - 41] / AST: 274 [0 - 41] / GGT: x     LIVER FUNCTIONS - ( 08 Aug 2018 07:45 )  Alb: 3.7 [3.5 - 5.2] / Pro: 6.3 [6.0 - 8.0] / ALK PHOS: 233 [30 - 115] / ALT: 723 [0 - 41] / AST: 372 [0 - 41] / GGT: x     LIVER FUNCTIONS - ( 07 Aug 2018 08:53 )  Alb: 4.0 [3.5 - 5.2] / Pro: 6.6 [6.0 - 8.0] / ALK PHOS: 252 [30 - 115] / ALT: >700 [0 - 41] / AST: 567 [0 - 41] / GGT: x         RADIOLOGY:    MRCP:  IMPRESSION:  1.  Cholelithiasis without evidence for choledocholithiasis or biliary   ductal dilation; low insertion of the cystic duct which courses posterior   to the common hepatic duct.    2.  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 A follow-up pancreas protocol MRI with   contrast is recommended in one year to document stability.        < from: CT Abdomen and Pelvis w/ IV Cont (08.06.18 @ 00:03) >  IMPRESSION:    No CT evidence for acute abdominopelvic pathology.    6 mm left lower lobe pulmonary nodule. As per Fleischner Society   guidelines, follow-up chest CT in 6-12 months is recommended           < end of copied text >

## 2018-08-15 VITALS
HEART RATE: 73 BPM | RESPIRATION RATE: 18 BRPM | TEMPERATURE: 97 F | SYSTOLIC BLOOD PRESSURE: 144 MMHG | DIASTOLIC BLOOD PRESSURE: 63 MMHG

## 2018-08-15 LAB
ALBUMIN SERPL ELPH-MCNC: 3.7 G/DL — SIGNIFICANT CHANGE UP (ref 3.5–5.2)
ALP SERPL-CCNC: 199 U/L — HIGH (ref 30–115)
ALT FLD-CCNC: 112 U/L — HIGH (ref 0–41)
ANION GAP SERPL CALC-SCNC: 14 MMOL/L — SIGNIFICANT CHANGE UP (ref 7–14)
AST SERPL-CCNC: 28 U/L — SIGNIFICANT CHANGE UP (ref 0–41)
BILIRUB SERPL-MCNC: 0.7 MG/DL — SIGNIFICANT CHANGE UP (ref 0.2–1.2)
BUN SERPL-MCNC: 15 MG/DL — SIGNIFICANT CHANGE UP (ref 10–20)
CALCIUM SERPL-MCNC: 8.4 MG/DL — LOW (ref 8.5–10.1)
CANCER AG19-9 SERPL-ACNC: 91.2 U/ML — HIGH
CHLORIDE SERPL-SCNC: 104 MMOL/L — SIGNIFICANT CHANGE UP (ref 98–110)
CO2 SERPL-SCNC: 25 MMOL/L — SIGNIFICANT CHANGE UP (ref 17–32)
CREAT SERPL-MCNC: 0.7 MG/DL — SIGNIFICANT CHANGE UP (ref 0.7–1.5)
GLUCOSE SERPL-MCNC: 86 MG/DL — SIGNIFICANT CHANGE UP (ref 70–99)
HCT VFR BLD CALC: 39.6 % — SIGNIFICANT CHANGE UP (ref 37–47)
HGB BLD-MCNC: 12.9 G/DL — SIGNIFICANT CHANGE UP (ref 12–16)
MCHC RBC-ENTMCNC: 27.6 PG — SIGNIFICANT CHANGE UP (ref 27–31)
MCHC RBC-ENTMCNC: 32.6 G/DL — SIGNIFICANT CHANGE UP (ref 32–37)
MCV RBC AUTO: 84.6 FL — SIGNIFICANT CHANGE UP (ref 81–99)
NRBC # BLD: 0 /100 WBCS — SIGNIFICANT CHANGE UP (ref 0–0)
PLATELET # BLD AUTO: 167 K/UL — SIGNIFICANT CHANGE UP (ref 130–400)
POTASSIUM SERPL-MCNC: 4.2 MMOL/L — SIGNIFICANT CHANGE UP (ref 3.5–5)
POTASSIUM SERPL-SCNC: 4.2 MMOL/L — SIGNIFICANT CHANGE UP (ref 3.5–5)
PROT SERPL-MCNC: 6.4 G/DL — SIGNIFICANT CHANGE UP (ref 6–8)
RBC # BLD: 4.68 M/UL — SIGNIFICANT CHANGE UP (ref 4.2–5.4)
RBC # FLD: 14.5 % — SIGNIFICANT CHANGE UP (ref 11.5–14.5)
SODIUM SERPL-SCNC: 143 MMOL/L — SIGNIFICANT CHANGE UP (ref 135–146)
WBC # BLD: 5.65 K/UL — SIGNIFICANT CHANGE UP (ref 4.8–10.8)
WBC # FLD AUTO: 5.65 K/UL — SIGNIFICANT CHANGE UP (ref 4.8–10.8)

## 2018-08-15 RX ORDER — QUETIAPINE FUMARATE 200 MG/1
1 TABLET, FILM COATED ORAL
Qty: 0 | Refills: 0 | COMMUNITY
Start: 2018-08-15

## 2018-08-15 RX ORDER — TRAZODONE HCL 50 MG
1 TABLET ORAL
Qty: 0 | Refills: 0 | COMMUNITY
Start: 2018-08-15

## 2018-08-15 RX ORDER — ESCITALOPRAM OXALATE 10 MG/1
0 TABLET, FILM COATED ORAL
Qty: 0 | Refills: 0 | COMMUNITY

## 2018-08-15 RX ORDER — ANASTROZOLE 1 MG/1
1 TABLET ORAL
Qty: 0 | Refills: 0 | COMMUNITY
Start: 2018-08-15

## 2018-08-15 RX ORDER — METOPROLOL TARTRATE 50 MG
1 TABLET ORAL
Qty: 0 | Refills: 0 | COMMUNITY
Start: 2018-08-15

## 2018-08-15 RX ORDER — ATORVASTATIN CALCIUM 80 MG/1
1 TABLET, FILM COATED ORAL
Qty: 0 | Refills: 0 | COMMUNITY
Start: 2018-08-15

## 2018-08-15 RX ORDER — ESCITALOPRAM OXALATE 10 MG/1
1 TABLET, FILM COATED ORAL
Qty: 0 | Refills: 0 | COMMUNITY
Start: 2018-08-15

## 2018-08-15 RX ORDER — METOPROLOL TARTRATE 50 MG
0 TABLET ORAL
Qty: 0 | Refills: 0 | COMMUNITY

## 2018-08-15 RX ADMIN — Medication 500 MILLIGRAM(S): at 17:01

## 2018-08-15 RX ADMIN — Medication 50 MILLIGRAM(S): at 05:40

## 2018-08-15 RX ADMIN — ESCITALOPRAM OXALATE 20 MILLIGRAM(S): 10 TABLET, FILM COATED ORAL at 11:42

## 2018-08-15 RX ADMIN — ANASTROZOLE 1 MILLIGRAM(S): 1 TABLET ORAL at 11:42

## 2018-08-15 RX ADMIN — Medication 500 MILLIGRAM(S): at 05:40

## 2018-08-15 RX ADMIN — ENOXAPARIN SODIUM 40 MILLIGRAM(S): 100 INJECTION SUBCUTANEOUS at 06:13

## 2018-08-15 NOTE — PROGRESS NOTE ADULT - SUBJECTIVE AND OBJECTIVE BOX
PERRI GROVE  78y Female    CHIEF COMPLAINT:    Patient is a 78y old  Female who presents with a chief complaint of severe abdominal pain (13 Aug 2018 11:30)      INTERVAL HPI/OVERNIGHT EVENTS:    Patient seen and examined. No more abdominal pain. No N/V. No fever    ROS: All other systems are negative.    Vital Signs:    T(F): 97.1 (08-15-18 @ 12:42), Max: 97.1 (08-15-18 @ 12:42)  HR: 73 (08-15-18 @ 12:42) (64 - 73)  BP: 144/63 (08-15-18 @ 12:42) (124/58 - 144/67)  RR: 18 (08-15-18 @ 12:42) (16 - 18)  SpO2: --  I&O's Summary    Daily     Daily   CAPILLARY BLOOD GLUCOSE          PHYSICAL EXAM:    GENERAL:  NAD  SKIN: No rashes or lesions  HENT: Atrumatic. Normocephalic. PERRL. Moist membranes.  NECK: Supple, No JVD. No lymphadenopathy.  PULMONARY: CTA B/L. No wheezing. No rales  CVS: Normal S1, S2. Rate and Rythm are regular. No murmurs.  ABDOMEN/GI: Soft, Nontender, Nondistended; BS present  EXTREMITIES: Peripheral pulses intact. No edema B/L LE.  NEUROLOGIC:  No motor or sensory deficit.  PSYCH: Alert & oriented x 3    Consultant(s) Notes Reviewed:  [x ] YES  [ ] NO  Care Discussed with Consultants/Other Providers [ x] YES  [ ] NO    EKG reviewed  Telemetry reviewed    LABS:                        12.9   5.65  )-----------( 167      ( 15 Aug 2018 08:15 )             39.6     08-15    143  |  104  |  15  ----------------------------<  86  4.2   |  25  |  0.7    Ca    8.4<L>      15 Aug 2018 08:15    TPro  6.4  /  Alb  3.7  /  TBili  0.7  /  DBili  x   /  AST  28  /  ALT  112<H>  /  AlkPhos  199<H>  08-15              RADIOLOGY & ADDITIONAL TESTS:      Imaging or report Personally Reviewed:  [ ] YES  [ ] NO    Medications:  Standing  anastrozole 1 milliGRAM(s) Oral daily  atorvastatin 20 milliGRAM(s) Oral at bedtime  ciprofloxacin     Tablet 500 milliGRAM(s) Oral every 12 hours  enoxaparin Injectable 40 milliGRAM(s) SubCutaneous every 24 hours  escitalopram 20 milliGRAM(s) Oral daily  melatonin 5 milliGRAM(s) Oral at bedtime  metoprolol succinate ER 50 milliGRAM(s) Oral daily  QUEtiapine 50 milliGRAM(s) Oral at bedtime  traZODone 100 milliGRAM(s) Oral at bedtime    PRN Meds      Case discussed with resident    Care discussed with pt/family

## 2018-08-15 NOTE — PROGRESS NOTE ADULT - PROVIDER SPECIALTY LIST ADULT
Gastroenterology
Gastroenterology
Hospitalist
Internal Medicine
Surgery
Intervent Radiology
Gastroenterology

## 2018-08-21 DIAGNOSIS — N30.00 ACUTE CYSTITIS WITHOUT HEMATURIA: ICD-10-CM

## 2018-08-21 DIAGNOSIS — R94.5 ABNORMAL RESULTS OF LIVER FUNCTION STUDIES: ICD-10-CM

## 2018-08-21 DIAGNOSIS — F32.9 MAJOR DEPRESSIVE DISORDER, SINGLE EPISODE, UNSPECIFIED: ICD-10-CM

## 2018-08-21 DIAGNOSIS — I25.10 ATHEROSCLEROTIC HEART DISEASE OF NATIVE CORONARY ARTERY WITHOUT ANGINA PECTORIS: ICD-10-CM

## 2018-08-21 DIAGNOSIS — K80.20 CALCULUS OF GALLBLADDER WITHOUT CHOLECYSTITIS WITHOUT OBSTRUCTION: ICD-10-CM

## 2018-08-21 DIAGNOSIS — I10 ESSENTIAL (PRIMARY) HYPERTENSION: ICD-10-CM

## 2021-11-23 NOTE — ED PROVIDER NOTE - NS_BEDUNITTYPES_ED_ALL_ED
Physical Therapy  Facility/Department: [address] REHAB  Daily Treatment Note  NAME: Enedelia Pereyra  : 1952  MRN: [id number]    Date of Service: 2021    Discharge Recommendations:  Continue to assess pending progress, Patient would benefit from continued therapy after discharge   PT Equipment Recommendations  Equipment Needed: No  Other: owns wheeled walker, may have family purchase bedrail to improve independence with bed mobility    Assessment   Body structures, Functions, Activity limitations: Decreased functional mobility ; Decreased strength; Decreased safe awareness; Decreased endurance; Decreased balance; Decreased posture  Assessment: Ms. Jair Brooks continues to demonstrate improving strength and endurance. She increased distance ambulating with wheeled walker to 110' and is supervision for balance. During ambulation, she is improving clearance of bilateral LE, but is still limited and reduces with fatigue. She responds well to verbal cueing and has improved foot clearance with shoes on. Patient is improving with stairs and she was able to ascend/descend 12 steps using bilateral hands on 1 rail to simulate home environment with supervision. Patient continues to be below baseline and will benefit from continued skilled therapy for strengthening, gait training, and functional mobility training to allow for return home. Treatment Diagnosis: impaired mobility  Specific instructions for Next Treatment: progress gait  Prognosis: Fair  Decision Making: Medium Complexity  PT Education: Plan of Care; General Safety; Functional Mobility Training; Transfer Training; Gait Training; Precautions; Adaptive Device Training; Energy Conservation  REQUIRES PT FOLLOW UP: Yes  Activity Tolerance  Activity Tolerance: Patient Tolerated treatment well; Patient limited by endurance     Patient Diagnosis(es): There were no encounter diagnoses.      has a past medical history of Depression, Hyperlipidemia, and Hypertension. has a past surgical history that includes cervical fusion (N/A, 11/10/2021). Social/Functional History  Lives With:  (sister and NICK)  Type of Home: House  Home Layout: Two level, Bed/Bath upstairs, 1/2 bath on main level (2 + basement. Pt has been sleeping in a lounge chair on the first floor)  Home Access: Stairs to enter without rails  Entrance Stairs - Number of Steps: 1 thru garage + flight upstairs to bedroom with one rail  Bathroom Shower/Tub: Walk-in shower, Shower chair with back (showers in basement)  Bathroom Toilet: Standard (uses towel bar to stand sometimes)  Bathroom Equipment: Shower chair, 3-in-1 commode  Bathroom Accessibility: Accessible  Home Equipment: Rolling walker, Wheelchair-manual, 4 wheeled walker (walker tray)  ADL Assistance: Independent  Homemaking Assistance:  (microwave meals, family assist with heaving cleaning and laundry)  Ambulation Assistance: Independent (RW in home, w/c in community (sister pushes w/c))  Transfer Assistance: Independent  Active : No  Patient's  Info: Sister drives  Type of occupation: worked for [employer] [address]: Reading, word search puzzles  Additional Comments: Sister works during the day and pt home alone. Pt denies falls. Patient reports she has a regular wheeled walker on each floor of the house. The four wheeled walker is only used when out with her sister. Restrictions  Restrictions/Precautions  Restrictions/Precautions: Fall Risk  Position Activity Restriction  Other position/activity restrictions: ACDF precautions (no excessive cervical motion), Hx of CP;  11-16-21: Diet: Dysphagia-minced and moist; liquids: mildly thick (nectar). Subjective   General  Chart Reviewed: Yes  Additional Pertinent Hx: Pt is a 71 y.o. female with hx of CP who presented to the ED on 11/7/21 with progressive generalized weakness and debility. Per Dr. Marvin Sterling H&P, \"71year-old female patient with cerebral palsy with recent history of increasing debility, inability to walk, generalized weakness. Patient states that she was not able to perform her ADLs, use her walker and her sister is not able to help her any longer as well. Work-up revealed the patient had severe cervical spinal cord compression due to large disc herniation and osteophytes at the C4-5 level. Patient was taken to surgery on 11/10 where she underwent anterior cervical discectomy with fusion and fixation of C4-5 per Dr. Osito Esquivel. Response To Previous Treatment: Patient with no complaints from previous session. Family / Caregiver Present: No  Referring Practitioner: Dr. Berkley Medina: patient seated in wheelchair following OT session and agreeable to therapy. She has no complaints of pain at this time. General Comment  Comments: Patient goes by Gerda Conn. Objective      Transfers  Sit to Stand: Supervision  Stand to sit: Supervision  Bed to Chair: Supervision (with wheeled walker)  Car Transfer: Supervision (with using wheeled walker for approach. She has difficulty lifting left LE in and out of the car, but was able to complete without physical assistance.)    Ambulation  Ambulation?: Yes  More Ambulation?: No  Ambulation 1  Surface: level tile  Device: Rolling Walker  Assistance: Supervision  Quality of Gait: step through pattern with reduced step length bilaterally, reduced bilateral foot clearance but is able to clear in swing phase (reduces with increased fatigue). Internal rotation of Left LE (L>R), which is chronic in nature  Gait Deviations: Slow Patsy; Decreased step length; Decreased step height  Distance: 15' to and from stairs, 80' with two turns, short distances in therapy gym  Comments: no LOB. Fatigues with multiple bouts of ambulation and foot clearance reducing.     Stairs/Curb  Stairs?: Yes  Stairs  # Steps : 12  Stairs Height: 6\"  Rails: Right ascending  Device: No Device  Assistance: Supervision  Comment: Patient used bilateral hands on right rail ascending with non-reciprocal pattern. She used slow pace but demonstrated improved ability to lift left LE up onto each step. She has difficulty positioning her feet on each step at times, and needs to reposition the feet before ascending or descending the next step. This is happening much less frequently than previous attempts    Curbs: 6\" (with wheeled walker. Patient required cues for sequence. She requires assist very minimal assist to advance walker, but supervision for balance)    PM Session  Patient in good spirits and reports no pain at this time. She is agreeable to therapy at this time. Sit<>stand with supervision  Ambulated 30' to therapy mat with wheeled walker with supervision. Sit>supine with modified independence using simulated bed rail. Modified independent scooting over in bed. Patient performed 15 reps bilateral LE ther ex while supine in bed: GS, APS, SAQ with roll under knees, hip add sets, hip abduction, hamstring sets with roll under knees, heel slides. Supine>sit with modified independence  Sit<>stand with supervision. Patient was able to turn and align self with chair without cueing. Ambulated 100' with wheeled walker with supervision. Patient with reducing bilateral step height, but she was able to maintain balance during gait. Stood at walker and performed 10 reps bilateral marching and hip flexion with knee extension with bilateral UE support on the walker and PT stabilizing walker. Patient was supervision for balance. Patient continues to demonstrate improving balance and endurance. It is anticipated she will be ready for discharge to home on Thursday.      Safety Device - Type of devices:  [x]  All fall risk precautions in place [] Bed alarm in place  [] Call light within reach [] Chair alarm in place [] Positioning belt [x] Gait belt [] Patient at risk for falls [] Left in bed [x] Left in chair (in wheelchair to return to room with transportation) [] Telesitter in use [] Sitter present [] Nurse notified []  None          Goals  Short term goals  Time Frame for Short term goals: 1 week  Short term goal 1: bed mobility at minimal assist  Short term goal 2: transfers at SBA/CGA - met - 2021  Short term goal 3: ambulation 48' with wheeled walker with CGA - met   Short term goal 4: perform curb step ascen/descend with CGA/minimal assist  Short term goal 5: perform 8 steps with CGA with rail - met   Long term goals  Time Frame for Long term goals : 1.5 to 2 weeks  Long term goal 1: transfer with MI  Long term goal 2: bed mobility with MI  Long term goal 3: Patient ambulate Abel  1560' with MI  Long term goal 4: Perform 12 steps with rail and SBA - met  Long term goal 5: perform curb step with SBA  Patient Goals   Patient goals : wants to get home again and be able to walk to make simple meal    Plan    Plan  Times per week: 5-6 x/week  Times per day: Twice a day  Specific instructions for Next Treatment: progress gait  Current Treatment Recommendations: Strengthening, ADL/Self-care Training, ROM, Functional Mobility Training, Transfer Training, Gait Training, Patient/Caregiver Education & Training, Safety Education & Training, Positioning, Balance Training, Endurance Training, Stair training, Home Exercise Program, Equipment Evaluation, Education, & procurement  Safety Devices  Type of devices:  All fall risk precautions in place, Gait belt, Left in chair (in wheelchair with care transitioned to SLP, Hawa, and student)  Restraints  Initially in place: No     Therapy Time   Individual Concurrent Group Co-treatment   Time In 1030         Time Out 1115         Minutes 45                Second Session Therapy Time     Individual Co-treatment   Time In 1345     Time Out 1430     Minutes Yohana Cochran, [id number] TELEMETRY

## 2022-02-28 NOTE — ED ADULT NURSE NOTE - [facility] ED NUR SYMPTOMS POS
Is This A New Presentation, Or A Follow-Up?: Skin Lesion What Type Of Note Output Would You Prefer (Optional)?: Bullet Format How Severe Is Your Skin Lesion?: mild Additional History: Patient stated she removed a skin tag at home. Has Your Skin Lesion Been Treated?: not been treated CHEST DISCOMFORT

## 2022-04-11 NOTE — PROGRESS NOTE ADULT - ASSESSMENT
Pulmonary/Critical Care Medicine   Progress Note    Date of service: 4/11/2022  Time: 1330    Weaned off Narcan drip.  Patient a/ox4 and without any complaints.  Wants to go home, but will keep overnight due to elevated liver enzymes.    Care discussed with the ICU hospitalist, Dr. Roberts, who will take over care at this time. The critical care team will sign off at this time.  Please call for any questions or concerns.    Pily Lundberg MD  Pulmonary and Critical Care Medicine         77 y/o F with PMH of HTN, CAD with stent, depression ,breast ca in remission presents with acute onset abdominal pain around umbilicus area, found to have elevated liver enzymes on admission.    1.	Abdominal pain likely biliary colic - resolved  2.	Abnormal Liver function study  3.	CAD/HTN  4.	UTI         PLAN:    ·	Liver biopsy report reviewed and results discussed with the pathologist. It was read as possible Ac. hepatitis, but no evidence of hepatitis clinically and lab W/U.  ·	AST, ALT and ALP continue to trend down. Ca 19-9 went down to 91.2. Most likely pt passed a stone.  ·	Will need cholecystectomy as an out pt.  ·	MRCP unremarkable. D/W the GI.  ·	Mitochondrial and smooth muscle antibodies are negative.  ·	Hep profile is also negative.  ·	Cont Metoprolol  ·	D/C Abx  ·	Waiting for MRI result. If negative will D/C her home    * Med rec done by me. Plan of care explained to the pt. in detail. Time spent 42 minutes. 79 y/o F with PMH of HTN, CAD with stent, depression ,breast ca in remission presents with acute onset abdominal pain around umbilicus area, found to have elevated liver enzymes on admission.    1.	Abdominal pain likely biliary colic - resolved  2.	Abnormal Liver function study  3.	CAD/HTN  4.	UTI         PLAN:    ·	Liver biopsy report reviewed and results discussed with the pathologist. It was read as possible Ac. hepatitis, but no evidence of hepatitis clinically and lab W/U.  ·	AST, ALT and ALP continue to trend down. Ca 19-9 went down to 91.2. Most likely pt passed a stone.  ·	Will need cholecystectomy as an out pt.  ·	MRCP unremarkable. D/W the GI.  ·	Mitochondrial and smooth muscle antibodies are negative.  ·	Hep profile is also negative.  ·	Cont Metoprolol  ·	D/C Abx  ·	Waiting for MRI result. If negative will D/C her home    * Med rec done by me. Plan of care explained to the pt. in detail. Time spent 42 minutes.    < from: MR Abdomen w/ IV Cont (08.15.18 @ 15:34) >    IMPRESSION:    1. 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 A follow-up pancreas   protocol MRI with contrast is recommended in one year to document   stability.    2. Interval development of small right pleural effusion.    3. Cholelithiasis, unchanged.    < end of copied text >    ADDENDUM:    MRI report reviewed. Results D/W the pt and her daughter on telephone. Will D/C her home.

## 2024-08-05 ENCOUNTER — NON-APPOINTMENT (OUTPATIENT)
Age: 84
End: 2024-08-05

## 2024-08-06 ENCOUNTER — NON-APPOINTMENT (OUTPATIENT)
Age: 84
End: 2024-08-06

## 2024-08-06 ENCOUNTER — APPOINTMENT (OUTPATIENT)
Dept: NEUROLOGY | Facility: CLINIC | Age: 84
End: 2024-08-06

## 2024-08-06 ENCOUNTER — TRANSCRIPTION ENCOUNTER (OUTPATIENT)
Age: 84
End: 2024-08-06

## 2024-08-06 PROBLEM — F32.2 MODERATELY SEVERE MAJOR DEPRESSION: Status: ACTIVE | Noted: 2024-08-06

## 2024-08-06 PROBLEM — R41.89 COGNITIVE IMPAIRMENT: Status: ACTIVE | Noted: 2024-08-06

## 2024-08-06 PROBLEM — Z00.00 ENCOUNTER FOR PREVENTIVE HEALTH EXAMINATION: Status: ACTIVE | Noted: 2024-08-06

## 2024-08-06 PROCEDURE — 99205 OFFICE O/P NEW HI 60 MIN: CPT

## 2024-08-08 PROBLEM — Z80.0 FAMILY HISTORY OF MALIGNANT NEOPLASM OF COLON: Status: ACTIVE | Noted: 2024-08-06

## 2024-08-08 PROBLEM — Z86.39 HISTORY OF HYPERCHOLESTEROLEMIA: Status: RESOLVED | Noted: 2024-08-06 | Resolved: 2024-08-08

## 2024-08-08 PROBLEM — Z86.79 HISTORY OF HYPERTENSION: Status: RESOLVED | Noted: 2024-08-06 | Resolved: 2024-08-08

## 2024-08-08 PROBLEM — Z87.42 HISTORY OF POLYCYSTIC OVARIAN SYNDROME: Status: RESOLVED | Noted: 2024-08-06 | Resolved: 2024-08-08

## 2024-08-08 PROBLEM — Z86.59 HISTORY OF DEPRESSION: Status: RESOLVED | Noted: 2024-08-06 | Resolved: 2024-08-08

## 2024-08-08 PROBLEM — H91.93 BILATERAL HEARING LOSS, UNSPECIFIED HEARING LOSS TYPE: Status: ACTIVE | Noted: 2024-08-08

## 2024-08-08 PROBLEM — Z78.9 DOES NOT USE ILLICIT DRUGS: Status: ACTIVE | Noted: 2024-08-08

## 2024-08-08 PROBLEM — Z87.39 HISTORY OF OSTEOPENIA: Status: RESOLVED | Noted: 2024-08-06 | Resolved: 2024-08-08

## 2024-08-08 PROBLEM — Z85.3 HISTORY OF MALIGNANT NEOPLASM OF BREAST: Status: RESOLVED | Noted: 2024-08-06 | Resolved: 2024-08-08

## 2024-08-08 PROBLEM — Z87.19 HISTORY OF GASTROESOPHAGEAL REFLUX (GERD): Status: RESOLVED | Noted: 2024-08-06 | Resolved: 2024-08-08

## 2024-08-08 PROBLEM — M54.50 LUMBAR PAIN WITH RADIATION DOWN LEG: Status: RESOLVED | Noted: 2024-08-06 | Resolved: 2024-08-08

## 2024-08-08 PROBLEM — Q76.49 SPINE ANOMALY: Status: RESOLVED | Noted: 2024-08-06 | Resolved: 2024-08-08

## 2024-08-08 PROBLEM — Z86.79 HISTORY OF CORONARY ARTERY DISEASE: Status: RESOLVED | Noted: 2024-08-06 | Resolved: 2024-08-08

## 2024-08-08 NOTE — PHYSICAL EXAM
[FreeTextEntry1] : Physical examination  General: No acute distress, Awake, Alert.  Mental status  Awake, alert, and oriented to person and place, impaired attention span and concentration, Recent and remote memory NOT intact, Language intact, Fund of knowledge impaired Cranial Nerves  II: VFF  III, IV, VI: PERRL, EOMI.  V: Facial sensation is normal B/L.  VII: Facial strength is normal B/L.  VIII: decreased hearing bilaterally IX, X: Palate is midline and elevates symmetrically.  XI: Trapezius normal strength.  XII: Tongue midline without atrophy or fasciculations.   Motor exam  Muscle tone - no evidence of rigidity or resistance in all 4 extremities.  No atrophy or fasciculations  Muscle Strength: arms and legs, proximal and distal flexors and extensors are normal EXCEpT BLE is 4/5 No UE drift.  Reflexes  Plantars right: mute.  Plantars left: mute.  Absent ankle jerks.   Coordination  Finger to nose: Normal.  Heel to shin: difficulty  Gait  wide-based; cautious; needs cane

## 2024-08-08 NOTE — ASSESSMENT
[FreeTextEntry1] : MoCA testing was 10/30.  I suspect a large portion of patient's cognitive decline is due to untreated depression.  There may be a component of cognitive impairment.  She would benefit from getting a neuropsychological evaluation. Continue Aricept 5 mg qhs  I strongly encouraged her to establish care with a psychiatrist and therapist.  She can follow-up with the previous practice that she had been seeing in [place].  Her daughter is trying to convince her to move closer as it may make taking care of her easier.  Recommend hearing aides  Declined PT. Needs daily exercise

## 2024-08-08 NOTE — HISTORY OF PRESENT ILLNESS
[FreeTextEntry1] : This is an 84-year-old right-handed woman who is being seen in neurologic consultation for evaluation of memory problems.  She is accompanied to this visit by her daughter.  The patient lives by herself in [place].  At present she is living with her daughter here in [place].  Patient has a significant and long history of depression and suicide attempts.  She has been noticed to have a significant memory loss over the last couple of years.  Her daughter Ana Lilia says that she does tend to lose things a lot.  She misplaces things.  She gets confused easily.  Short-term memory is much worse than long-term memory. She does sleep well.  She is eating well.  Her daughter feels that there has been an increased amount of visual hallucinations.  There is a tendency to not be attentive and leaves the stove on.  The patient's daughter works as a .  She works 11-hour shifts sometimes.  Her mother will accompany her to work because she is afraid to leave her alone at home.  Patient was admitted this past spring to [facility].  She was in subacute rehab.  Apparently Aricept was started there.  Remeron was added during that admission there.  About 3 to 4 weeks later her memory declined a lot.  Although the Remeron was stopped after 2 to 3 weeks the patient was not back to baseline.  For this reason she was admitted to [facility].  Workup including MRI was normal.  Ana Lilia tells me that the patient has a long history of depression.  However she stopped following with her psychiatrist and therapist unbeknownst to her.  She has been on Lexapro 20 mg for a while.  This has been refilled by her primary care physician. Patient's daughter overlooks her finances.  Rony has never driven so that is not an issue. Recent depression has been triggered by the death of her son in January.  Her other son has been diagnosed with cancer.  This is very distressful to her.

## 2024-08-08 NOTE — HISTORY OF PRESENT ILLNESS
[FreeTextEntry1] : This is an 84-year-old right-handed woman who is being seen in neurologic consultation for evaluation of memory problems.  She is accompanied to this visit by her daughter.  The patient lives by herself in [place].  At present she is living with her daughter here in [place].  Patient has a significant and long history of depression and suicide attempts.  She has been noticed to have a significant memory loss over the last couple of years.  Her daughter Ana Lilia says that she does tend to lose things a lot.  She misplaces things.  She gets confused easily.  Short-term memory is much worse than long-term memory. She does sleep well.  She is eating well.  Her daughter feels that there has been an increased amount of visual hallucinations.  There is a tendency to not be attentive and leaves the stove on.  The patient's daughter works as a .  She works 11-hour shifts sometimes.  Her mother will accompany her to work because she is afraid to leave her alone at home.  Patient was admitted this past spring to [facility].  She was in subacute rehab.  Apparently Aricept was started there.  Remeron was added during that admission there.  About 3 to 4 weeks later her memory declined a lot.  Although the Remeron was stopped after 2 to 3 weeks the patient was not back to baseline.  For this reason she was admitted to [facility].  Workup including MRI was normal.  Ana Lilia tells me that the patient has a long history of depression.  However she stopped following with her psychiatrist and therapist unbeknownst to her.  She has been on Lexapro 20 mg for a while.  This has been refilled by her primary care physician. Patient's daughter overlooks her finances.  Rony has never driven so that is not an issue. Recent depression has been triggered by the death of her son in January.  Her other son has been diagnosed with cancer.  This is very distressful to her.

## 2024-08-08 NOTE — ASSESSMENT
[FreeTextEntry1] : MoCA testing was 10/30.  I suspect a large portion of patient's cognitive decline is due to untreated depression.  There may be a component of cognitive impairment.  She would benefit from getting a neuropsychological evaluation. Continue Aricept 5 mg qhs  I strongly encouraged her to establish care with a psychiatrist and therapist.  She can follow-up with the previous practice that she had been seeing in [place].  Her daughter is trying to convince her to move closer as it may make taking care of her easier.  Recommend hearing aides  Declined PT. Needs daily exercise

## 2024-08-08 NOTE — DATA REVIEWED
[de-identified] : The brain was performed at [facility].  It did not show any acute intracranial changes.  Nonspecific T2 hyperintensities were noted

## 2024-08-08 NOTE — DATA REVIEWED
[de-identified] : The brain was performed at [facility].  It did not show any acute intracranial changes.  Nonspecific T2 hyperintensities were noted

## 2024-09-04 ENCOUNTER — APPOINTMENT (OUTPATIENT)
Dept: NEUROLOGY | Facility: CLINIC | Age: 84
End: 2024-09-04
Payer: MEDICARE

## 2024-09-04 DIAGNOSIS — F32.2 MAJOR DEPRESSIVE DISORDER, SINGLE EPISODE, SEVERE W/OUT PSYCHOTIC FEATURES: ICD-10-CM

## 2024-09-04 DIAGNOSIS — G30.1 ALZHEIMER'S DISEASE WITH LATE ONSET: ICD-10-CM

## 2024-09-04 DIAGNOSIS — F02.80 ALZHEIMER'S DISEASE WITH LATE ONSET: ICD-10-CM

## 2024-09-04 PROCEDURE — 99214 OFFICE O/P EST MOD 30 MIN: CPT

## 2024-09-04 RX ORDER — MEMANTINE HYDROCHLORIDE 5 MG-10 MG
28 X 5 MG & KIT ORAL
Qty: 1 | Refills: 0 | Status: ACTIVE | COMMUNITY
Start: 2024-09-04 | End: 1900-01-01

## 2024-09-04 NOTE — DATA REVIEWED
[de-identified] : The brain was performed at [facility].  It did not show any acute intracranial changes.  Nonspecific T2 hyperintensities were noted

## 2024-09-04 NOTE — ASSESSMENT
[FreeTextEntry1] : The neuropsychological assessment reveals a significant dementia most likely Alzheimer's disease.  The interpreting neuropsychologist feels that the memory deficits noted were greater than that could be attributed to hearing loss and her mood disorder.  She is already on donepezil 5 mg at bedtime.  She will continue this.  I will add memantine.  She will get a titration pack. I will increase the donepezil to 10 mg at next visit. She still needs to establish care with psychiatry and therapy.  Discussed the need to be physically, socially, and mentally active.  Ana Lilia reports that her mother does not necessarily follow directions.  She has a home in [place].  She is trying to get extra help at home.  At present it is not clear whether Rony will be moving to [place] or staying in [place].  She is likely going to need significant oversight in reference to medications and day-to-day activities. She still will not wear hearing aids.  Follow-up in 4 months with Danisha Arenas.

## 2024-09-04 NOTE — HISTORY OF PRESENT ILLNESS
[FreeTextEntry1] : 9/4/24 Rony is here in telemetry follow-up.  Her daughter Ana Lilia Grossman is on the phone with her. We reviewed neuropsychological assessment.  8/6/24 This is an 84-year-old right-handed woman who is being seen in neurologic consultation for evaluation of memory problems.  She is accompanied to this visit by her daughter.  The patient lives by herself in [place].  At present she is living with her daughter here in [place].  Patient has a significant and long history of depression and suicide attempts.  She has been noticed to have a significant memory loss over the last couple of years.  Her daughter Ana Lilia says that she does tend to lose things a lot.  She misplaces things.  She gets confused easily.  Short-term memory is much worse than long-term memory. She does sleep well.  She is eating well.  Her daughter feels that there has been an increased amount of visual hallucinations.  There is a tendency to not be attentive and leaves the stove on.  The patient's daughter works as a .  She works 11-hour shifts sometimes.  Her mother will accompany her to work because she is afraid to leave her alone at home.  Patient was admitted this past spring to [facility].  She was in subacute rehab.  Apparently Aricept was started there.  Remeron was added during that admission there.  About 3 to 4 weeks later her memory declined a lot.  Although the Remeron was stopped after 2 to 3 weeks the patient was not back to baseline.  For this reason she was admitted to [facility].  Workup including MRI was normal.  Ana Lilia tells me that the patient has a long history of depression.  However she stopped following with her psychiatrist and therapist unbeknownst to her.  She has been on Lexapro 20 mg for a while.  This has been refilled by her primary care physician. Patient's daughter overlooks her finances.  Rony has never driven so that is not an issue. Recent depression has been triggered by the death of her son in January.  Her other son has been diagnosed with cancer.  This is very distressful to her.

## 2024-09-04 NOTE — REASON FOR VISIT
[Consultation] : a consultation visit [Home] : at home, [unfilled] , at the time of the visit. [Medical Office: ([facility])___] : at the medical office located in  [Patient] : the patient [FreeTextEntry1] : pt came today for dementia.

## 2024-12-09 ENCOUNTER — RX RENEWAL (OUTPATIENT)
Age: 84
End: 2024-12-09